# Patient Record
Sex: MALE | Race: WHITE | NOT HISPANIC OR LATINO
[De-identification: names, ages, dates, MRNs, and addresses within clinical notes are randomized per-mention and may not be internally consistent; named-entity substitution may affect disease eponyms.]

---

## 2017-02-01 ENCOUNTER — APPOINTMENT (OUTPATIENT)
Dept: UROLOGY | Facility: CLINIC | Age: 67
End: 2017-02-01

## 2017-02-02 LAB
PSA FREE FLD-MCNC: 17.2 %
PSA FREE SERPL-MCNC: 1.13 NG/ML
PSA SERPL-MCNC: 6.57 NG/ML

## 2017-08-09 ENCOUNTER — APPOINTMENT (OUTPATIENT)
Dept: UROLOGY | Facility: CLINIC | Age: 67
End: 2017-08-09
Payer: COMMERCIAL

## 2017-08-09 PROCEDURE — 99213 OFFICE O/P EST LOW 20 MIN: CPT

## 2017-08-10 LAB
APPEARANCE: CLEAR
BACTERIA: NEGATIVE
BILIRUBIN URINE: NEGATIVE
BLOOD URINE: NEGATIVE
COLOR: YELLOW
CORE LAB FLUID CYTOLOGY: NORMAL
GLUCOSE QUALITATIVE U: NORMAL MG/DL
HYALINE CASTS: 0 /LPF
KETONES URINE: NEGATIVE
LEUKOCYTE ESTERASE URINE: NEGATIVE
MICROSCOPIC-UA: NORMAL
NITRITE URINE: NEGATIVE
PH URINE: 6.5
PROTEIN URINE: NEGATIVE MG/DL
PSA FREE FLD-MCNC: 13.3
PSA FREE SERPL-MCNC: 0.26 NG/ML
PSA SERPL-MCNC: 1.95 NG/ML
RED BLOOD CELLS URINE: 5 /HPF
SPECIFIC GRAVITY URINE: 1.02
SQUAMOUS EPITHELIAL CELLS: 0 /HPF
UROBILINOGEN URINE: NORMAL MG/DL
WHITE BLOOD CELLS URINE: 1 /HPF

## 2017-08-25 ENCOUNTER — EMERGENCY (EMERGENCY)
Facility: HOSPITAL | Age: 67
LOS: 1 days | Discharge: HOME | End: 2017-08-25
Attending: EMERGENCY MEDICINE | Admitting: EMERGENCY MEDICINE
Payer: COMMERCIAL

## 2017-08-25 VITALS
HEART RATE: 98 BPM | TEMPERATURE: 99 F | RESPIRATION RATE: 18 BRPM | SYSTOLIC BLOOD PRESSURE: 135 MMHG | DIASTOLIC BLOOD PRESSURE: 68 MMHG | OXYGEN SATURATION: 98 %

## 2017-08-25 VITALS
HEART RATE: 103 BPM | OXYGEN SATURATION: 95 % | SYSTOLIC BLOOD PRESSURE: 144 MMHG | RESPIRATION RATE: 20 BRPM | TEMPERATURE: 98 F | DIASTOLIC BLOOD PRESSURE: 83 MMHG

## 2017-08-25 LAB
ALBUMIN SERPL ELPH-MCNC: 3.9 G/DL — SIGNIFICANT CHANGE UP (ref 3.3–5)
ALP SERPL-CCNC: 86 U/L — SIGNIFICANT CHANGE UP (ref 40–120)
ALT FLD-CCNC: 19 U/L RC — SIGNIFICANT CHANGE UP (ref 10–45)
ANION GAP SERPL CALC-SCNC: 15 MMOL/L — SIGNIFICANT CHANGE UP (ref 5–17)
AST SERPL-CCNC: 22 U/L — SIGNIFICANT CHANGE UP (ref 10–40)
BASOPHILS # BLD AUTO: 0 K/UL — SIGNIFICANT CHANGE UP (ref 0–0.2)
BASOPHILS NFR BLD AUTO: 0 % — SIGNIFICANT CHANGE UP (ref 0–2)
BILIRUB SERPL-MCNC: 0.7 MG/DL — SIGNIFICANT CHANGE UP (ref 0.2–1.2)
BUN SERPL-MCNC: 17 MG/DL — SIGNIFICANT CHANGE UP (ref 7–23)
CALCIUM SERPL-MCNC: 8.8 MG/DL — SIGNIFICANT CHANGE UP (ref 8.4–10.5)
CHLORIDE SERPL-SCNC: 104 MMOL/L — SIGNIFICANT CHANGE UP (ref 96–108)
CK SERPL-CCNC: 125 U/L — SIGNIFICANT CHANGE UP (ref 30–200)
CO2 SERPL-SCNC: 23 MMOL/L — SIGNIFICANT CHANGE UP (ref 22–31)
CREAT SERPL-MCNC: 1.18 MG/DL — SIGNIFICANT CHANGE UP (ref 0.5–1.3)
CRP SERPL-MCNC: 0.9 MG/DL — HIGH (ref 0–0.4)
EOSINOPHIL # BLD AUTO: 0 K/UL — SIGNIFICANT CHANGE UP (ref 0–0.5)
EOSINOPHIL NFR BLD AUTO: 0.2 % — SIGNIFICANT CHANGE UP (ref 0–6)
ERYTHROCYTE [SEDIMENTATION RATE] IN BLOOD: 13 MM/HR — SIGNIFICANT CHANGE UP (ref 0–20)
GAS PNL BLDV: SIGNIFICANT CHANGE UP
GLUCOSE SERPL-MCNC: 110 MG/DL — HIGH (ref 70–99)
HCT VFR BLD CALC: 43.5 % — SIGNIFICANT CHANGE UP (ref 39–50)
HGB BLD-MCNC: 15.1 G/DL — SIGNIFICANT CHANGE UP (ref 13–17)
LYMPHOCYTES # BLD AUTO: 0.6 K/UL — LOW (ref 1–3.3)
LYMPHOCYTES # BLD AUTO: 10.3 % — LOW (ref 13–44)
MCHC RBC-ENTMCNC: 33.6 PG — SIGNIFICANT CHANGE UP (ref 27–34)
MCHC RBC-ENTMCNC: 34.7 GM/DL — SIGNIFICANT CHANGE UP (ref 32–36)
MCV RBC AUTO: 96.9 FL — SIGNIFICANT CHANGE UP (ref 80–100)
MONOCYTES # BLD AUTO: 0.6 K/UL — SIGNIFICANT CHANGE UP (ref 0–0.9)
MONOCYTES NFR BLD AUTO: 9 % — SIGNIFICANT CHANGE UP (ref 2–14)
NEUTROPHILS # BLD AUTO: 5 K/UL — SIGNIFICANT CHANGE UP (ref 1.8–7.4)
NEUTROPHILS NFR BLD AUTO: 80.4 % — HIGH (ref 43–77)
PLATELET # BLD AUTO: 168 K/UL — SIGNIFICANT CHANGE UP (ref 150–400)
POTASSIUM SERPL-MCNC: 3.7 MMOL/L — SIGNIFICANT CHANGE UP (ref 3.5–5.3)
POTASSIUM SERPL-SCNC: 3.7 MMOL/L — SIGNIFICANT CHANGE UP (ref 3.5–5.3)
PROT SERPL-MCNC: 6.9 G/DL — SIGNIFICANT CHANGE UP (ref 6–8.3)
RBC # BLD: 4.49 M/UL — SIGNIFICANT CHANGE UP (ref 4.2–5.8)
RBC # FLD: 11.4 % — SIGNIFICANT CHANGE UP (ref 10.3–14.5)
SODIUM SERPL-SCNC: 142 MMOL/L — SIGNIFICANT CHANGE UP (ref 135–145)
WBC # BLD: 6.2 K/UL — SIGNIFICANT CHANGE UP (ref 3.8–10.5)
WBC # FLD AUTO: 6.2 K/UL — SIGNIFICANT CHANGE UP (ref 3.8–10.5)

## 2017-08-25 PROCEDURE — 84132 ASSAY OF SERUM POTASSIUM: CPT

## 2017-08-25 PROCEDURE — 99284 EMERGENCY DEPT VISIT MOD MDM: CPT | Mod: 25

## 2017-08-25 PROCEDURE — 82550 ASSAY OF CK (CPK): CPT

## 2017-08-25 PROCEDURE — 82947 ASSAY GLUCOSE BLOOD QUANT: CPT

## 2017-08-25 PROCEDURE — 85014 HEMATOCRIT: CPT

## 2017-08-25 PROCEDURE — 86140 C-REACTIVE PROTEIN: CPT

## 2017-08-25 PROCEDURE — 82435 ASSAY OF BLOOD CHLORIDE: CPT

## 2017-08-25 PROCEDURE — 96374 THER/PROPH/DIAG INJ IV PUSH: CPT

## 2017-08-25 PROCEDURE — 80053 COMPREHEN METABOLIC PANEL: CPT

## 2017-08-25 PROCEDURE — 83605 ASSAY OF LACTIC ACID: CPT

## 2017-08-25 PROCEDURE — 82803 BLOOD GASES ANY COMBINATION: CPT

## 2017-08-25 PROCEDURE — 85027 COMPLETE CBC AUTOMATED: CPT

## 2017-08-25 PROCEDURE — 85652 RBC SED RATE AUTOMATED: CPT

## 2017-08-25 PROCEDURE — 84295 ASSAY OF SERUM SODIUM: CPT

## 2017-08-25 PROCEDURE — 82330 ASSAY OF CALCIUM: CPT

## 2017-08-25 RX ORDER — KETOROLAC TROMETHAMINE 30 MG/ML
15 SYRINGE (ML) INJECTION ONCE
Qty: 0 | Refills: 0 | Status: DISCONTINUED | OUTPATIENT
Start: 2017-08-25 | End: 2017-08-25

## 2017-08-25 RX ADMIN — Medication 15 MILLIGRAM(S): at 04:00

## 2017-08-25 NOTE — ED PROVIDER NOTE - ATTENDING CONTRIBUTION TO CARE
Dr. Levine (Attending Physician)  I performed a history and physical exam of the patient and discussed their management with the resident. I reviewed the resident's note and agree with the documented findings and plan of care. My medical decision making and observations are found above.

## 2017-08-25 NOTE — ED PROVIDER NOTE - SKIN RASH DESCRIPTION
b/l medial calf erythema [id number], warm with mild tenderness,  medial thigh erythema 3x3cm, no inguinal Lymphadenopathy

## 2017-08-25 NOTE — ED PROVIDER NOTE - CARE PLAN
Principal Discharge DX:	Erythema nodosum  Instructions for follow-up, activity and diet:	You were seen in the Emergency Department for rash. Your examination and lab tests were reassuring. Please follow up with dermatology and rheumatology for further evaluation. Please return to the Emergency Department if you have any new concerning symptoms such as severe pain, weakness, shortness of breath or any other concerning symptoms.

## 2017-08-25 NOTE — ED PROVIDER NOTE - MEDICAL DECISION MAKING DETAILS
Dr. Levine (Attending Physician)  Pt. with painful patches to bilateral lower extremities since this morning on his legs.  No recent URI symptoms.  No ho IBD.  No rash on palms and soles, no oral lesion.  Likely erythema nodosum.  Will give follow-up with derm and rheumatology.  Advised ibuprofen.

## 2017-08-25 NOTE — ED PROVIDER NOTE - PLAN OF CARE
You were seen in the Emergency Department for rash. Your examination and lab tests were reassuring. Please follow up with dermatology and rheumatology for further evaluation. Please return to the Emergency Department if you have any new concerning symptoms such as severe pain, weakness, shortness of breath or any other concerning symptoms.

## 2017-08-25 NOTE — ED ADULT NURSE NOTE - OBJECTIVE STATEMENT
0147 pt 66y m aox4 c/o of lwr ext red spotting noticed in am, seen by pmd and dx poss bug bites, states the redness is getting wider and warm to touch, pt pending eval/vss no distress family at bedside/gcruz

## 2017-08-25 NOTE — ED PROVIDER NOTE - OBJECTIVE STATEMENT
65yo p/w rash. Pt. woke up this morning with erythma to b/l lower extremities, pain to the sites of the rash. Pt. reports pain to right inguinal region radiating downward. Pt. reports chills and nausea. Denies fevers, cough, chest pain, shortness ofbreath, recent travel, outdoor activities, insect bites, new exposures, foods, detergents, lotions, sick contacts, falls. Pt. taking benadryl and benadryl cream today w/o relief. Pt. has prostate cancer s/p radiation(march), HTN.

## 2017-08-26 ENCOUNTER — INPATIENT (INPATIENT)
Facility: HOSPITAL | Age: 67
LOS: 2 days | Discharge: HOME | DRG: 303 | End: 2017-08-29
Attending: INTERNAL MEDICINE | Admitting: INTERNAL MEDICINE
Payer: COMMERCIAL

## 2017-08-26 VITALS
SYSTOLIC BLOOD PRESSURE: 143 MMHG | OXYGEN SATURATION: 94 % | RESPIRATION RATE: 18 BRPM | TEMPERATURE: 99 F | HEART RATE: 80 BPM | DIASTOLIC BLOOD PRESSURE: 71 MMHG

## 2017-08-26 DIAGNOSIS — Z90.49 ACQUIRED ABSENCE OF OTHER SPECIFIED PARTS OF DIGESTIVE TRACT: Chronic | ICD-10-CM

## 2017-08-26 DIAGNOSIS — Z96.649 PRESENCE OF UNSPECIFIED ARTIFICIAL HIP JOINT: Chronic | ICD-10-CM

## 2017-08-26 LAB
ALBUMIN SERPL ELPH-MCNC: 4 G/DL — SIGNIFICANT CHANGE UP (ref 3.3–5)
ALP SERPL-CCNC: 92 U/L — SIGNIFICANT CHANGE UP (ref 40–120)
ALT FLD-CCNC: 21 U/L RC — SIGNIFICANT CHANGE UP (ref 10–45)
ANION GAP SERPL CALC-SCNC: 12 MMOL/L — SIGNIFICANT CHANGE UP (ref 5–17)
AST SERPL-CCNC: 34 U/L — SIGNIFICANT CHANGE UP (ref 10–40)
BASE EXCESS BLDV CALC-SCNC: 2.8 MMOL/L — HIGH (ref -2–2)
BASOPHILS # BLD AUTO: 0 K/UL — SIGNIFICANT CHANGE UP (ref 0–0.2)
BASOPHILS NFR BLD AUTO: 0 % — SIGNIFICANT CHANGE UP (ref 0–2)
BILIRUB SERPL-MCNC: 0.3 MG/DL — SIGNIFICANT CHANGE UP (ref 0.2–1.2)
BUN SERPL-MCNC: 13 MG/DL — SIGNIFICANT CHANGE UP (ref 7–23)
CA-I SERPL-SCNC: 1.2 MMOL/L — SIGNIFICANT CHANGE UP (ref 1.12–1.3)
CALCIUM SERPL-MCNC: 9.2 MG/DL — SIGNIFICANT CHANGE UP (ref 8.4–10.5)
CHLORIDE BLDV-SCNC: 105 MMOL/L — SIGNIFICANT CHANGE UP (ref 96–108)
CHLORIDE SERPL-SCNC: 104 MMOL/L — SIGNIFICANT CHANGE UP (ref 96–108)
CO2 BLDV-SCNC: 31 MMOL/L — HIGH (ref 22–30)
CO2 SERPL-SCNC: 25 MMOL/L — SIGNIFICANT CHANGE UP (ref 22–31)
CREAT SERPL-MCNC: 1.07 MG/DL — SIGNIFICANT CHANGE UP (ref 0.5–1.3)
EOSINOPHIL # BLD AUTO: 0.1 K/UL — SIGNIFICANT CHANGE UP (ref 0–0.5)
EOSINOPHIL NFR BLD AUTO: 2.1 % — SIGNIFICANT CHANGE UP (ref 0–6)
GAS PNL BLDV: 140 MMOL/L — SIGNIFICANT CHANGE UP (ref 136–145)
GAS PNL BLDV: SIGNIFICANT CHANGE UP
GLUCOSE BLDV-MCNC: 104 MG/DL — HIGH (ref 70–99)
GLUCOSE SERPL-MCNC: 109 MG/DL — HIGH (ref 70–99)
HCO3 BLDV-SCNC: 29 MMOL/L — SIGNIFICANT CHANGE UP (ref 21–29)
HCT VFR BLD CALC: 46.3 % — SIGNIFICANT CHANGE UP (ref 39–50)
HCT VFR BLDA CALC: 48 % — SIGNIFICANT CHANGE UP (ref 39–50)
HGB BLD CALC-MCNC: 15.7 G/DL — SIGNIFICANT CHANGE UP (ref 13–17)
HGB BLD-MCNC: 15.7 G/DL — SIGNIFICANT CHANGE UP (ref 13–17)
LACTATE BLDV-MCNC: 1.1 MMOL/L — SIGNIFICANT CHANGE UP (ref 0.7–2)
LYMPHOCYTES # BLD AUTO: 0.6 K/UL — LOW (ref 1–3.3)
LYMPHOCYTES # BLD AUTO: 15 % — SIGNIFICANT CHANGE UP (ref 13–44)
MCHC RBC-ENTMCNC: 33.4 PG — SIGNIFICANT CHANGE UP (ref 27–34)
MCHC RBC-ENTMCNC: 33.9 GM/DL — SIGNIFICANT CHANGE UP (ref 32–36)
MCV RBC AUTO: 98.6 FL — SIGNIFICANT CHANGE UP (ref 80–100)
MONOCYTES # BLD AUTO: 0.5 K/UL — SIGNIFICANT CHANGE UP (ref 0–0.9)
MONOCYTES NFR BLD AUTO: 11.1 % — SIGNIFICANT CHANGE UP (ref 2–14)
NEUTROPHILS # BLD AUTO: 3.1 K/UL — SIGNIFICANT CHANGE UP (ref 1.8–7.4)
NEUTROPHILS NFR BLD AUTO: 71.8 % — SIGNIFICANT CHANGE UP (ref 43–77)
OTHER CELLS CSF MANUAL: 7 ML/DL — LOW (ref 18–22)
PCO2 BLDV: 53 MMHG — HIGH (ref 35–50)
PH BLDV: 7.36 — SIGNIFICANT CHANGE UP (ref 7.35–7.45)
PLATELET # BLD AUTO: 174 K/UL — SIGNIFICANT CHANGE UP (ref 150–400)
PO2 BLDV: 23 MMHG — LOW (ref 25–45)
POTASSIUM BLDV-SCNC: 4.5 MMOL/L — SIGNIFICANT CHANGE UP (ref 3.5–5)
POTASSIUM SERPL-MCNC: 5 MMOL/L — SIGNIFICANT CHANGE UP (ref 3.5–5.3)
POTASSIUM SERPL-SCNC: 5 MMOL/L — SIGNIFICANT CHANGE UP (ref 3.5–5.3)
PROT SERPL-MCNC: 7.7 G/DL — SIGNIFICANT CHANGE UP (ref 6–8.3)
RBC # BLD: 4.7 M/UL — SIGNIFICANT CHANGE UP (ref 4.2–5.8)
RBC # FLD: 11.4 % — SIGNIFICANT CHANGE UP (ref 10.3–14.5)
SAO2 % BLDV: 32 % — LOW (ref 67–88)
SODIUM SERPL-SCNC: 141 MMOL/L — SIGNIFICANT CHANGE UP (ref 135–145)
WBC # BLD: 4.2 K/UL — SIGNIFICANT CHANGE UP (ref 3.8–10.5)
WBC # FLD AUTO: 4.2 K/UL — SIGNIFICANT CHANGE UP (ref 3.8–10.5)

## 2017-08-26 PROCEDURE — 93970 EXTREMITY STUDY: CPT | Mod: 26

## 2017-08-26 PROCEDURE — 99220: CPT

## 2017-08-26 RX ORDER — CEFAZOLIN SODIUM 1 G
1000 VIAL (EA) INJECTION EVERY 8 HOURS
Qty: 0 | Refills: 0 | Status: DISCONTINUED | OUTPATIENT
Start: 2017-08-26 | End: 2017-08-29

## 2017-08-26 RX ORDER — CEPHALEXIN 500 MG
500 CAPSULE ORAL
Qty: 0 | Refills: 0 | Status: DISCONTINUED | OUTPATIENT
Start: 2017-08-26 | End: 2017-08-26

## 2017-08-26 RX ORDER — AMLODIPINE BESYLATE 2.5 MG/1
5 TABLET ORAL DAILY
Qty: 0 | Refills: 0 | Status: DISCONTINUED | OUTPATIENT
Start: 2017-08-26 | End: 2017-08-28

## 2017-08-26 RX ORDER — OXYCODONE HYDROCHLORIDE 5 MG/1
5 TABLET ORAL ONCE
Qty: 0 | Refills: 0 | Status: DISCONTINUED | OUTPATIENT
Start: 2017-08-26 | End: 2017-08-26

## 2017-08-26 RX ORDER — SODIUM CHLORIDE 9 MG/ML
1000 INJECTION INTRAMUSCULAR; INTRAVENOUS; SUBCUTANEOUS
Qty: 0 | Refills: 0 | Status: DISCONTINUED | OUTPATIENT
Start: 2017-08-26 | End: 2017-08-27

## 2017-08-26 RX ORDER — KETOROLAC TROMETHAMINE 30 MG/ML
15 SYRINGE (ML) INJECTION ONCE
Qty: 0 | Refills: 0 | Status: DISCONTINUED | OUTPATIENT
Start: 2017-08-26 | End: 2017-08-26

## 2017-08-26 RX ORDER — ACETAMINOPHEN 500 MG
1000 TABLET ORAL ONCE
Qty: 0 | Refills: 0 | Status: COMPLETED | OUTPATIENT
Start: 2017-08-26 | End: 2017-08-26

## 2017-08-26 RX ADMIN — Medication 400 MILLIGRAM(S): at 19:25

## 2017-08-26 RX ADMIN — SODIUM CHLORIDE 125 MILLILITER(S): 9 INJECTION INTRAMUSCULAR; INTRAVENOUS; SUBCUTANEOUS at 22:54

## 2017-08-26 RX ADMIN — Medication 15 MILLIGRAM(S): at 19:55

## 2017-08-26 RX ADMIN — OXYCODONE HYDROCHLORIDE 5 MILLIGRAM(S): 5 TABLET ORAL at 20:30

## 2017-08-26 RX ADMIN — OXYCODONE HYDROCHLORIDE 5 MILLIGRAM(S): 5 TABLET ORAL at 19:56

## 2017-08-26 RX ADMIN — Medication 15 MILLIGRAM(S): at 20:30

## 2017-08-26 RX ADMIN — Medication 1000 MILLIGRAM(S): at 19:40

## 2017-08-26 RX ADMIN — Medication 500 MILLIGRAM(S): at 19:19

## 2017-08-26 RX ADMIN — AMLODIPINE BESYLATE 5 MILLIGRAM(S): 2.5 TABLET ORAL at 23:11

## 2017-08-26 RX ADMIN — Medication 100 MILLIGRAM(S): at 19:20

## 2017-08-26 NOTE — ED CDU PROVIDER NOTE - DETAILS
CELLULITIS Vs. LYME  -IVF  -IV Abx  -ID Following  -Pain/Fever Control  -Freq Re-eval  Case d/w Attending Lexie CELLULITIS Vs. Early Disseminated Lyme  -IVF  -IV Abx  -ID Following  -Pain/Fever Control  -David Re-eval  Case d/w Attending Lexie

## 2017-08-26 NOTE — ED PROVIDER NOTE - NS ED ROS FT
CONST: no fevers, positive chills  EYES: no pain  ENT: no sore throat   CV: no chest pain  RESP: no shortness of breath  ABD: no abdominal pain   : no dysuria  MSK: right hip pain  NEURO: no headache or additional neurologic complaints  HEME: no easy bleeding  SKIN:  erythema on right calf

## 2017-08-26 NOTE — ED CDU PROVIDER NOTE - SKIN RASH DESCRIPTION
flat, erythematous patchy areas on b/l LE from ankle to thighs R > L, + warmth and ttp, sensation intact, DP pulses 2+ b/l

## 2017-08-26 NOTE — CONSULT NOTE ADULT - SUBJECTIVE AND OBJECTIVE BOX
HPI:      PAST MEDICAL & SURGICAL HISTORY:  Prostate CA: finished treatment in 2017 (march)  HTN (hypertension)  S/P cholecystectomy  S/P colon resection: diverticulitis  History of hip replacement      Allergies  No Known Allergies        ANTIMICROBIALS:      OTHER MEDS:      SOCIAL HISTORY: [ ] etoh [ ] tobacco [ ] former smoker [ ] IVDU    FAMILY HISTORY:      REVIEW OF SYSTEMS  [  ] ROS unobtainable because:    [  ] All other systems negative except as noted below:	    Constitutional:  [ ] fever [ ] weight loss  Skin:  [ ] rash [ ] phlebitis	  Eyes: [ ] icterus [ ] inflammation	  ENMT: [ ] discharge [ ] thrush [ ] ulcers [ ] exudates  Respiratory: [ ] dyspnea [ ] hemoptysis [ ] cough [ ] sputum	  Cardiovascular:  [ ] chest pain [ ] palpitations [ ] edema	  Gastrointestinal:  [ ] nausea [ ] vomiting [ ] diarrhea [ ] constipation [ ] pain	  Genitourinary:  [ ] dysuria [ ] frequency [ ] hematuria [ ] discharge [ ] flank pain  Musculoskeletal:  [ ] myalgias [ ] arthralgias [ ] arthritis	  Neurological:  [ ] headache [ ] seizures	  Psychiatric:  [ ] anxiety [ ] depression	  Hematology/Lymphatics:  [ ] lymphadenopathy  Endocrine:  [ ] adrenal [ ] thyroid  Allergic/Immunologic:	 [ ] transplant [ ] seasonal    Vital Signs Last 24 Hrs  T(F): 99.1 (08-26-17 @ 15:31), Max: 99.1 (08-26-17 @ 15:31)    Vital Signs Last 24 Hrs  HR: 80 (08-26-17 @ 15:31) (80 - 80)  BP: 143/71 (08-26-17 @ 15:31) (143/71 - 143/71)  RR: 18 (08-26-17 @ 15:31)  SpO2: 94% (08-26-17 @ 15:31) (94% - 94%)  Wt(kg): --    PHYSICAL EXAM:  General: non-toxic  HEAD/EYES: anicteric, PERRL  ENT:  supple  Cardiovascular:   S1, S2  Respiratory:  clear bilaterally  GI:  soft, non-tender, normal bowel sounds  :  no CVA tenderness   Musculoskeletal:  no synovitis  Neurologic:  grossly non-focal  Skin:  no rash  Lymph: no lymphadenopathy  Psychiatric:  appropriate affect  Vascular:  no phlebitis                                15.7   4.2   )-----------( 174      ( 26 Aug 2017 17:11 )             46.3       08-26    141  |  104  |  13  ----------------------------<  109<H>  5.0   |  25  |  1.07    Ca    9.2      26 Aug 2017 17:11    TPro  7.7  /  Alb  4.0  /  TBili  0.3  /  DBili  x   /  AST  34  /  ALT  21  /  AlkPhos  92  08-26          MICROBIOLOGY:          v            RADIOLOGY: HPI:   66 year old, presenting with left calf erythema since Thursday morning. On Thursday patient had similar lesions on left calF and medial thighs. patient was refered to dermatology with biopsy obtained patient now with extreme difficulty walking, growing erythema on right calf and warmth. no fevers. pain is well treated with motrin.       PAST MEDICAL & SURGICAL HISTORY:  Prostate CA: finished treatment in 2017 (march)  HTN (hypertension)  S/P cholecystectomy  S/P colon resection: diverticulitis  History of hip replacement      Allergies  No Known Allergies        ANTIMICROBIALS:      OTHER MEDS:      SOCIAL HISTORY: [ ] etoh [ ] tobacco [ ] former smoker [ ] IVDU    FAMILY HISTORY:      REVIEW OF SYSTEMS  [  ] ROS unobtainable because:    [  ] All other systems negative except as noted below:	    Constitutional:  [ ] fever [ ] weight loss  Skin:  [ ] rash [ ] phlebitis	  Eyes: [ ] icterus [ ] inflammation	  ENMT: [ ] discharge [ ] thrush [ ] ulcers [ ] exudates  Respiratory: [ ] dyspnea [ ] hemoptysis [ ] cough [ ] sputum	  Cardiovascular:  [ ] chest pain [ ] palpitations [ ] edema	  Gastrointestinal:  [ ] nausea [ ] vomiting [ ] diarrhea [ ] constipation [ ] pain	  Genitourinary:  [ ] dysuria [ ] frequency [ ] hematuria [ ] discharge [ ] flank pain  Musculoskeletal:  [ ] myalgias [ ] arthralgias [ ] arthritis	  Neurological:  [ ] headache [ ] seizures	  Psychiatric:  [ ] anxiety [ ] depression	  Hematology/Lymphatics:  [ ] lymphadenopathy  Endocrine:  [ ] adrenal [ ] thyroid  Allergic/Immunologic:	 [ ] transplant [ ] seasonal    Vital Signs Last 24 Hrs  T(F): 99.1 (08-26-17 @ 15:31), Max: 99.1 (08-26-17 @ 15:31)    Vital Signs Last 24 Hrs  HR: 80 (08-26-17 @ 15:31) (80 - 80)  BP: 143/71 (08-26-17 @ 15:31) (143/71 - 143/71)  RR: 18 (08-26-17 @ 15:31)  SpO2: 94% (08-26-17 @ 15:31) (94% - 94%)  Wt(kg): --    PHYSICAL EXAM:  General: non-toxic  HEAD/EYES: anicteric, PERRL  ENT:  supple  Cardiovascular:   S1, S2  Respiratory:  clear bilaterally  GI:  soft, non-tender, normal bowel sounds  :  no CVA tenderness   Musculoskeletal:  no synovitis  Neurologic:  grossly non-focal  Skin:  no rash  Lymph: no lymphadenopathy  Psychiatric:  appropriate affect  Vascular:  no phlebitis                                15.7   4.2   )-----------( 174      ( 26 Aug 2017 17:11 )             46.3       08-26    141  |  104  |  13  ----------------------------<  109<H>  5.0   |  25  |  1.07    Ca    9.2      26 Aug 2017 17:11    TPro  7.7  /  Alb  4.0  /  TBili  0.3  /  DBili  x   /  AST  34  /  ALT  21  /  AlkPhos  92  08-26          MICROBIOLOGY:          v            RADIOLOGY: HPI:   66 year old, presenting with left calf erythema since Thursday morning. On Thursday patient had similar lesions on left calF and medial thighs. patient was refered to dermatology with biopsy obtained patient now with extreme difficulty walking, growing erythema on right calf and warmth. no fevers. pain is well treated with motrin.   pt recently came from [place], though he does not complain of tick bits  no recent trauma and no recent bites,   no pets, no h.o recent cellulitis  chills at home but no fever.  vitals stable on presentation   no major lab abnormalities detected        PAST MEDICAL & SURGICAL HISTORY:  Prostate CA: finished treatment in 2017 (march)  HTN (hypertension)  S/P cholecystectomy  S/P colon resection: diverticulitis  History of hip replacement      Allergies  No Known Allergies        ANTIMICROBIALS:      OTHER MEDS:      SOCIAL HISTORY:non smoker and non alcoholic    FAMILY HISTORY:non significant       REVIEW OF SYSTEMS  [  ] ROS unobtainable because:    [ x ] All other systems negative except as noted below:	    Constitutional:  [ ] fever [ ] weight loss  Skin:  [x ] rash [ ] phlebitis	  Eyes: [ ] icterus [ ] inflammation	  ENMT: [ ] discharge [ ] thrush [ ] ulcers [ ] exudates  Respiratory: [ ] dyspnea [ ] hemoptysis [ ] cough [ ] sputum	  Cardiovascular:  [ ] chest pain [ ] palpitations [ ] edema	  Gastrointestinal:  [ ] nausea [ ] vomiting [ ] diarrhea [ ] constipation [ ] pain	  Genitourinary:  [ ] dysuria [ ] frequency [ ] hematuria [ ] discharge [ ] flank pain  Musculoskeletal:  [ ] myalgias [ ] arthralgias [ ] arthritis	  Neurological:  [ ] headache [ ] seizures	  Psychiatric:  [ ] anxiety [ ] depression	  Hematology/Lymphatics:  [ ] lymphadenopathy  Endocrine:  [ ] adrenal [ ] thyroid  Allergic/Immunologic:	 [ ] transplant [ ] seasonal    Vital Signs Last 24 Hrs  T(F): 99.1 (08-26-17 @ 15:31), Max: 99.1 (08-26-17 @ 15:31)    Vital Signs Last 24 Hrs  HR: 80 (08-26-17 @ 15:31) (80 - 80)  BP: 143/71 (08-26-17 @ 15:31) (143/71 - 143/71)  RR: 18 (08-26-17 @ 15:31)  SpO2: 94% (08-26-17 @ 15:31) (94% - 94%)  Wt(kg): --    PHYSICAL EXAM:  General: non-toxic  HEAD/EYES: anicteric, PERRL  ENT:  supple  Cardiovascular:   S1, S2  Respiratory:  clear bilaterally  GI:  soft, non-tender, normal bowel sounds  :  no CVA tenderness   Musculoskeletal:  no synovitis  Neurologic:  grossly non-focal  Skin: b/l lower extermity rash, ascending, and tender to touch   Lymph: no lymphadenopathy  Psychiatric:  appropriate affect  Vascular:  no phlebitis                                15.7   4.2   )-----------( 174      ( 26 Aug 2017 17:11 )             46.3       08-26    141  |  104  |  13  ----------------------------<  109<H>  5.0   |  25  |  1.07    Ca    9.2      26 Aug 2017 17:11    TPro  7.7  /  Alb  4.0  /  TBili  0.3  /  DBili  x   /  AST  34  /  ALT  21  /  AlkPhos  92  08-26          MICROBIOLOGY:- none           v            RADIOLOGY- none HPI:   66 year old, presenting with left calf erythema since Thursday morning. On Thursday patient had similar lesions on left calF and medial thighs. patient was refered to dermatology with biopsy obtained patient now with extreme difficulty walking, growing erythema on right calf and warmth. no fevers. pain is well treated with motrin.   pt recently came from [place], though he does not complain of tick bits  no recent trauma and no recent bites,   no pets, no h.o recent cellulitis  chills at home but no fever.  vitals stable on presentation   no major lab abnormalities detected        PAST MEDICAL & SURGICAL HISTORY:  Prostate CA: finished treatment in 2017 (march)  HTN (hypertension)  S/P cholecystectomy  S/P colon resection: diverticulitis  History of hip replacement      Allergies  No Known Allergies        ANTIMICROBIALS:      OTHER MEDS:      SOCIAL HISTORY:non smoker and non alcoholic    FAMILY HISTORY:non significant       REVIEW OF SYSTEMS  [  ] ROS unobtainable because:    [ x ] All other systems negative except as noted below:	    Constitutional:  [ ] fever [ ] weight loss  Skin:  [x ] rash [ ] phlebitis	  Eyes: [ ] icterus [ ] inflammation	  ENMT: [ ] discharge [ ] thrush [ ] ulcers [ ] exudates  Respiratory: [ ] dyspnea [ ] hemoptysis [ ] cough [ ] sputum	  Cardiovascular:  [ ] chest pain [ ] palpitations [ ] edema	  Gastrointestinal:  [ ] nausea [ ] vomiting [ ] diarrhea [ ] constipation [ ] pain	  Genitourinary:  [ ] dysuria [ ] frequency [ ] hematuria [ ] discharge [ ] flank pain  Musculoskeletal:  [ ] myalgias [ ] arthralgias [ ] arthritis	  Neurological:  [ ] headache [ ] seizures	  Psychiatric:  [ ] anxiety [ ] depression	  Hematology/Lymphatics:  [ ] lymphadenopathy  Endocrine:  [ ] adrenal [ ] thyroid  Allergic/Immunologic:	 [ ] transplant [ ] seasonal    Vital Signs Last 24 Hrs  T(F): 99.1 (08-26-17 @ 15:31), Max: 99.1 (08-26-17 @ 15:31)    Vital Signs Last 24 Hrs  HR: 80 (08-26-17 @ 15:31) (80 - 80)  BP: 143/71 (08-26-17 @ 15:31) (143/71 - 143/71)  RR: 18 (08-26-17 @ 15:31)  SpO2: 94% (08-26-17 @ 15:31) (94% - 94%)  Wt(kg): --    PHYSICAL EXAM:  General: non-toxic  HEAD/EYES: anicteric, PERRL  ENT:  supple  Cardiovascular:   S1, S2  Respiratory:  clear bilaterally  GI:  soft, non-tender, normal bowel sounds  :  no CVA tenderness   Musculoskeletal:  no synovitis  Neurologic:  grossly non-focal  Skin: b/l lower extermity rash, ascending, and tender to touch   Lymph: no lymphadenopathy  Psychiatric:  appropriate affect  Vascular:  no phlebitis                                15.7   4.2   )-----------( 174      ( 26 Aug 2017 17:11 )             46.3       08-26    141  |  104  |  13  ----------------------------<  109<H>  5.0   |  25  |  1.07    Ca    9.2      26 Aug 2017 17:11    TPro  7.7  /  Alb  4.0  /  TBili  0.3  /  DBili  x   /  AST  34  /  ALT  21  /  AlkPhos  92  08-26          MICROBIOLOGY:- none       RADIOLOGY- none

## 2017-08-26 NOTE — ED CDU PROVIDER NOTE - PROGRESS NOTE DETAILS
Spoke with ID Fellow regarding patient's stay in CDU instead of d/c home. Agrees with plan for IV Abx. Will start doxy 100mg q12 and cefazolin 1g Q8. ID will see patient in AM. - Yeimi Thomas PA-C Patient asleep resting comfortably. NAD. VSS. - Yeimi Thomas PA-C Patient resting. NAD. VSS. - Yeimi Thomas PA-C Patient resting in bed. Reports burning pain returns every 3.5-4 hours after oxycodone. Also reports erythema is spreading on lower extremities. VSS. On exam, darkening erythema on b/l calves with spreading erythema on medial thighs and to right hip, all areas warm and ttp. Will give pain medication and continue to monitor. - Yeimi Thomas PA-C Lying in bed, reports that the pain and redness in both legs is still increasing in severity and size respectively. Vital Signs within normal limits Patient resting in bed comfortably. No distress, no complaints. Vital Signs Stable. The pain is still severe, and debilitating. He has difficulty ambulating secondary to the pain in both legs . -Lambert Lucero PA-C Lying in bed, reports that the pain and redness in both legs is still increasing in severity and size respectively. Vital Signs within normal limits. - Lambert Lucero PA-C Dr. Sue: Pt seen by ID who are recommending admission at this time for worsening BL LE cellulitis despite abx. Pt still c/o pain which is improved w medications. Duplex neg. Stable for admission.

## 2017-08-26 NOTE — ED ADULT NURSE REASSESSMENT NOTE - NS ED NURSE REASSESS COMMENT FT1
Redness noted on b/l legs. Redness noted on b/l legs below knees. Pt c/o pain on R leg. Dr. Pfeiffer aware.

## 2017-08-26 NOTE — ED ADULT NURSE REASSESSMENT NOTE - NS ED NURSE REASSESS COMMENT FT1
Received pt from ANNMARIE Petit, received pt alert and responsive, oriented x4, denies any respiratory distress, SOB, or difficulty breathing. Pt transferred to CDU for observation for BLE cellulitis, pt states 4/10 "burning sensation" to BLE, will treat pain as needed, per PA pain med not due at this time, will continue to monitor and manage, pt placed in IV fluids, tolerating well. IV in place, patent and free of signs of infiltration, pending IV antibiotics at 0100 and 0700, pt aware. V/S stable, pt afebrile, Pt educated on unit and unit rules, instructed patient to notify RN of any needed assistance, Pt verbalizes understanding, Call bell placed within reach. Safety maintained. Will continue to monitor. Spouse at bedside.

## 2017-08-26 NOTE — ED PROVIDER NOTE - OBJECTIVE STATEMENT
66 year old, presenting with left calf erythema since thursday morning. on Thursday patient had simila lesions on left ana and medial thighs. patient was refered to dermatology with biopsy obtained. patient now with extreme difficulty walking, growing erythema on right calf and warmth. no fevers. pain is well treated with motrin. ER and Dermatologist thought it could be fungal, erythema nodosum, bug bites, possible cellulitis.     PMD: shantel pérez 66 year old, presenting with left calf erythema since thursday morning. on Thursday patient had simila lesions on left ana and medial thighs. patient was refered to dermatology with biopsy obtained. patient now with extreme difficulty walking, growing erythema on right calf and warmth. no fevers. pain is well treated with motrin. ER and Dermatologist thought it could be fungal, erythema nodosum, bug bites, possible cellulitis.     PMD: shantel pérez    Attendinyo male presents with achy, erythematous rash on bilateral lower extremities for about 1 week.  Had biopsy by dermatology.  no fever/chills.  Sent by Dr. Hurley, a good friend of the patient, who wanted ID to see the patient.  NO fever, chills, nausea or vomiting.

## 2017-08-26 NOTE — ED PROVIDER NOTE - PROGRESS NOTE DETAILS
K[facility]rell PGY3: PMD [place] [id number]  [employer]:  pt seen by ID who believes rash is likely to be cellulitis, but could possibly be Lyme.  Too early in disease course to check lyme titer.  ID recommends cefedroxil and doxycycline.  Pt does not want to stay in hospital or in observation unit so will discharge home with close outpatient followup.  Spoke with Dr. Hurley regarding case as he called to check on patient.. [facility] PGY3: patient discharged and on receiving paperwork, patient was in excruciating leg pain in medial right thigh, remains tender in calfs bilaterally. will obtain dvt studies, provide analgesia. discussed case with CDU for possible obs

## 2017-08-26 NOTE — CONSULT NOTE ADULT - ASSESSMENT
67 yo male wcomes in with erythema of the legs and painful and b/l  he has mild elevation in temperature and no white count   he recently travelled to [place] and lives in [place], does not complain of ticks though   Would cover for lymes, and strp cellulitis  Would give oral doxycycline and Cefadroxil oral regimen for strp and lyme , can do lyme serology after two weeks outpt   give antifungal topical cream for the groin, obese male   Will d/w atttending 67 yo male comes in with erythema of the legs and painful and b/l  he has mild elevation in temperature and no white count   he recently travelled to [place] and lives in [place], does not complain of ticks though   Would cover for lymes, and strp cellulitis  Would give oral doxycycline and Cefadroxil oral regimen for strep and lyme , can do lyme serology after two weeks outpt   give antifungal topical cream for the groin, obese male   Will d/w atttending

## 2017-08-26 NOTE — ED CDU PROVIDER NOTE - OBJECTIVE STATEMENT
66 year old with PMH HTN, Prostate CA (Jan-march 2017 s/p radiation therapy), presenting with b/l calf erythema since Thursday morning that spread to medial thighs. patient was referred to dermatology with biopsy obtained. patient now with extreme difficulty walking due to pain, growing erythema on right calf and warmth. no fevers. pain was well treated with motrin. ER and Dermatologist thought it could be fungal, erythema nodosum, bug bites, possible cellulitis. Pt now reports pain at R hip with no skin changes. Also c/o chills. Denies fever, nausea/vomiting, mucous membrane involvement.    PCP Brock Danielle & (family friend) Terry Lombardo    In ED, pt seen by ED to be d/c home on doxy and cefpodoxime, however pt c/o unbearable pain. B/l LE doppler negative. Will admit to CDU for pain control and IV Abx; ID to see in AM. 66 year old with PMH HTN, Prostate CA (Jan-march 2017 s/p radiation therapy), presenting with b/l calf erythema, initially only LLE since Thursday morning, that spread to medial thighs. patient was referred to dermatology with biopsy obtained. patient now with extreme difficulty walking due to pain, growing erythema/warmth on right calf. no fevers. pain was well treated with motrin. ER and Dermatologist thought it could be fungal, erythema nodosum, bug bites, possible cellulitis. Pt now reports pain at R hip with no skin changes. Also c/o chills. Denies fever, nausea/vomiting, mucous membrane involvement.    PCP Brock Danielle & (family friend) Terry Lombardo    In ED, pt seen by ED to be d/c home on doxy and cefpodoxime, however pt c/o unbearable pain. B/l LE doppler negative. Will admit to CDU for pain control and IV Abx; ID to see in AM. 66 year old with PMH HTN, Prostate CA (Jan-march 2017 s/p radiation therapy), presenting with b/l calf erythema, initially only LLE since Thursday morning, that spread b/l and to medial thighs. Patient in ED yesterday, referred to dermatology with biopsy obtained. patient now with extreme difficulty walking due to pain, growing erythema/warmth on right calf. no fevers. pain was well treated with motrin. ER and Dermatologist thought it could be fungal, erythema nodosum, bug bites, possible cellulitis. Pt now reports pain at R hip with no skin changes. Also c/o chills. Denies fever, nausea/vomiting, mucous membrane involvement.    PCP Brock Danielle & (family friend) Terry Lombardo    In ED, pt seen by ED to be d/c home on doxy and cefpodoxime, however pt c/o unbearable pain. B/l LE doppler negative. Will admit to CDU for pain control and IV Abx; ID to see in AM. 66 year old with PMH HTN, Prostate CA (Jan-march 2017 s/p radiation therapy), presenting with b/l calf erythema since Thursday morning that spread to medial thighs. Patient in ED yesterday, referred to dermatology with biopsy obtained. patient now with extreme difficulty walking due to pain, growing erythema/warmth on right calf. no fevers. pain was well treated with motrin. ER and Dermatologist thought it could be fungal, erythema nodosum, bug bites, possible cellulitis. Pt now reports pain at R hip with no skin changes. Also c/o chills. Denies fever, nausea/vomiting, mucous membrane involvement.    PCP Brock Danielle & (family friend) Terry Lombardo    In ED, pt seen by ED to be d/c home on doxy and cefpodoxime, however pt c/o unbearable pain. B/l LE doppler negative. Will admit to CDU for pain control and IV Abx; ID to see in AM. 66 year old with PMH HTN, Prostate CA (Jan-march 2017 s/p radiation therapy), presenting with b/l calf erythema since Thursday morning that spread to medial thighs. Patient in ED yesterday, referred to dermatology with biopsy obtained. patient now with extreme difficulty walking due to pain, growing erythema/warmth on right calf. no fevers. pain was well treated with motrin. ER and Dermatologist thought it could be fungal, erythema nodosum, bug bites, possible cellulitis. Pt now reports pain at R hip with no skin changes. Also c/o chills. Denies fever, nausea/vomiting, mucous membrane involvement.    PCP Brock Danielle & (family friend) Terry Lombardo    Attending:  In ED, pt seen by ED to be d/c home on doxy and cefpodoxime, however pt c/o unbearable pain. B/l LE doppler negative. Will admit to CDU for pain control and IV Abx; ID to see in AM.

## 2017-08-26 NOTE — ED ADULT NURSE REASSESSMENT NOTE - COMFORT CARE
plan of care explained/warm blanket provided/ambulated to bathroom/darkened lights/repositioned
ambulated to bathroom

## 2017-08-26 NOTE — ED ADULT NURSE NOTE - OBJECTIVE STATEMENT
66 y.o male c c/o L calf redness since Thursday morning. Thursday patient had similar lesions on left calf and thigh. pt saw dermatologist and had biopsy. patient Pain upon ambulating now in L calf. redness on R calf/warm. Denies fever. pain is well treated with Motrin. Family at bedside.

## 2017-08-26 NOTE — ED CDU PROVIDER NOTE - PLAN OF CARE
Continue your current medication regimen.  Take doxycycline 100mg twice daily for 14 days. Take with food and avoid sun exposure while taking this medication.   Take cefadroxil 500mg twice daily for 7 days.  Follow up with your Primary Care Physician within the next 2-3 days for further evaluation and blood work for Lyme disease.   Bring a copy of your test results with you to your appointment.  Return to the Emergency Room if you experience new or worsening symptoms. Continue your current medication regimen.  Take doxycycline 100mg twice daily for 14 days. Take with food and avoid sun exposure while taking this medication.   Take cefadroxil 500mg twice daily for 7 days.  Follow up with your Primary Care Physician within the next 2-3 days for further evaluation and blood work for Lyme disease.   Bring a copy of your test results with you to your appointment.  Return to the Emergency Room if you experience new or worsening symptoms including worsening pain, redness, fever, altered mental status, joint pains, inability to tolerate antibiotics, or any other concerns.

## 2017-08-26 NOTE — ED PROVIDER NOTE - CARE PLAN
Principal Discharge DX:	Cellulitis of lower extremity, unspecified laterality Principal Discharge DX:	Cellulitis of lower extremity, unspecified laterality  Instructions for follow-up, activity and diet:	Follow up with your primary care doctor within 48-72 hours.   You must return for new, worsening or concerning symptoms; specifically including those listed on the attached sheet.   Discuss a tick born illness blood panel with your doctor.  Take doxycycline 100mg 2x per day for 14 days   Take Cefadroxil 500mg 2x per day for 7 days  You must return for new, worsening or concerning symptom; specifically including those listed on the attached sheet.

## 2017-08-26 NOTE — ED PROVIDER NOTE - PHYSICAL EXAMINATION
[place]: A & O x 3, NAD, HEENT WNL and no facial asymmetry; lungs CTAB, heart with reg rhythm; abdomen soft obese NTND; extremities with bilateral trace edema; skin with warmth, erythema and tenderness at right calf, tenderness with no obvious erythema on left calf. non specific erythema at medial thighs virtually imperceptible, neuro exam non focal with no motor or sensory deficits

## 2017-08-26 NOTE — ED PROVIDER NOTE - PLAN OF CARE
Follow up with your primary care doctor within 48-72 hours.   You must return for new, worsening or concerning symptoms; specifically including those listed on the attached sheet.   Discuss a tick born illness blood panel with your doctor.  Take doxycycline 100mg 2x per day for 14 days   Take Cefadroxil 500mg 2x per day for 7 days  You must return for new, worsening or concerning symptom; specifically including those listed on the attached sheet.

## 2017-08-27 DIAGNOSIS — L03.119 CELLULITIS OF UNSPECIFIED PART OF LIMB: ICD-10-CM

## 2017-08-27 DIAGNOSIS — B35.3 TINEA PEDIS: ICD-10-CM

## 2017-08-27 DIAGNOSIS — I10 ESSENTIAL (PRIMARY) HYPERTENSION: ICD-10-CM

## 2017-08-27 PROCEDURE — 99223 1ST HOSP IP/OBS HIGH 75: CPT | Mod: AI

## 2017-08-27 PROCEDURE — 71020: CPT | Mod: 26

## 2017-08-27 PROCEDURE — 99217: CPT

## 2017-08-27 RX ORDER — OXYCODONE HYDROCHLORIDE 5 MG/1
5 TABLET ORAL ONCE
Qty: 0 | Refills: 0 | Status: DISCONTINUED | OUTPATIENT
Start: 2017-08-27 | End: 2017-08-27

## 2017-08-27 RX ORDER — KETOROLAC TROMETHAMINE 30 MG/ML
15 SYRINGE (ML) INJECTION ONCE
Qty: 0 | Refills: 0 | Status: DISCONTINUED | OUTPATIENT
Start: 2017-08-27 | End: 2017-08-27

## 2017-08-27 RX ORDER — KETOROLAC TROMETHAMINE 30 MG/ML
30 SYRINGE (ML) INJECTION ONCE
Qty: 0 | Refills: 0 | Status: DISCONTINUED | OUTPATIENT
Start: 2017-08-27 | End: 2017-08-27

## 2017-08-27 RX ORDER — LACTOBACILLUS ACIDOPH-L.BULGARICUS 1 MILLION CELL CHEWABLE TABLET 1MM CELL
1 TABLET,CHEWABLE ORAL DAILY
Qty: 0 | Refills: 0 | Status: DISCONTINUED | OUTPATIENT
Start: 2017-08-27 | End: 2017-08-29

## 2017-08-27 RX ADMIN — Medication 15 MILLIGRAM(S): at 21:57

## 2017-08-27 RX ADMIN — Medication 15 MILLIGRAM(S): at 21:27

## 2017-08-27 RX ADMIN — OXYCODONE HYDROCHLORIDE 5 MILLIGRAM(S): 5 TABLET ORAL at 07:28

## 2017-08-27 RX ADMIN — OXYCODONE HYDROCHLORIDE 5 MILLIGRAM(S): 5 TABLET ORAL at 01:08

## 2017-08-27 RX ADMIN — OXYCODONE HYDROCHLORIDE 5 MILLIGRAM(S): 5 TABLET ORAL at 19:32

## 2017-08-27 RX ADMIN — AMLODIPINE BESYLATE 5 MILLIGRAM(S): 2.5 TABLET ORAL at 21:27

## 2017-08-27 RX ADMIN — Medication 15 MILLIGRAM(S): at 05:38

## 2017-08-27 RX ADMIN — Medication 15 MILLIGRAM(S): at 04:58

## 2017-08-27 RX ADMIN — OXYCODONE HYDROCHLORIDE 5 MILLIGRAM(S): 5 TABLET ORAL at 13:06

## 2017-08-27 RX ADMIN — Medication 30 MILLIGRAM(S): at 13:05

## 2017-08-27 RX ADMIN — Medication 1 APPLICATION(S): at 18:41

## 2017-08-27 RX ADMIN — OXYCODONE HYDROCHLORIDE 5 MILLIGRAM(S): 5 TABLET ORAL at 20:02

## 2017-08-27 RX ADMIN — OXYCODONE HYDROCHLORIDE 5 MILLIGRAM(S): 5 TABLET ORAL at 00:21

## 2017-08-27 RX ADMIN — Medication 100 MILLIGRAM(S): at 18:41

## 2017-08-27 RX ADMIN — Medication 100 MILLIGRAM(S): at 17:06

## 2017-08-27 RX ADMIN — Medication 110 MILLIGRAM(S): at 06:36

## 2017-08-27 RX ADMIN — SODIUM CHLORIDE 125 MILLILITER(S): 9 INJECTION INTRAMUSCULAR; INTRAVENOUS; SUBCUTANEOUS at 08:48

## 2017-08-27 RX ADMIN — Medication 30 MILLIGRAM(S): at 12:58

## 2017-08-27 RX ADMIN — OXYCODONE HYDROCHLORIDE 5 MILLIGRAM(S): 5 TABLET ORAL at 07:00

## 2017-08-27 RX ADMIN — OXYCODONE HYDROCHLORIDE 5 MILLIGRAM(S): 5 TABLET ORAL at 12:58

## 2017-08-27 RX ADMIN — Medication 100 MILLIGRAM(S): at 08:46

## 2017-08-27 RX ADMIN — Medication 100 MILLIGRAM(S): at 01:09

## 2017-08-27 NOTE — H&P ADULT - ASSESSMENT
67 yo M with hx. of prostate ca s/p rt, htn p/w bilateral lower extremity cellulitis and concern for early lyme disease

## 2017-08-27 NOTE — PROVIDER CONTACT NOTE (OTHER) - SITUATION
pt questioning his P.O. antibiotics vs. iv and wanted certain pain medication that worked well for him in ED

## 2017-08-27 NOTE — CONSULT NOTE ADULT - SUBJECTIVE AND OBJECTIVE BOX
HPI:   Patient is a 66y male who presents to [facility] with a chief complaint of bilateral lower extremity erythema and painful to touch which started on Thursday. He reports that the day prior he felt OK. He denies prior to thursday any fever, chills sweats or rigors. he denies any trauma or injury to lower extremity. He report that he had a recent trip to [place] but stayed at a hotel and did not do outdoors activities and commented that there were minimal trees in the area. He reports he does not hunt, does not camp or spend time in wooded areas. He said he came to ER here at [facility] and was told that he possibly had erythema nodosum and was recommended that he go to a dermatologist. He was able to get a dermatology consult and ended getting a biopsy performed on friday afternoon. He reports that by Friday night the redness migrated up his legs and noted that he had it on both lower legs. He spoke with Dr Lombardo and he suggested that the patient return to the hospital. He was seen by the Infectious Disease hospital service who recommend for patient to take cefadroxil for cellulitis and doxycycline for possible early Lyme.   I was called by Dr Lombardo for a second opinion.     REVIEW OF SYSTEMS:  All other review of systems negative (Comprehensive ROS)    PAST MEDICAL & SURGICAL HISTORY:  Prostate CA: finished treatment in 2017 (march)  HTN (hypertension)  S/P cholecystectomy  S/P colon resection: diverticulitis  History of hip replacement      Allergies    No Known Allergies    Intolerances        Antimicrobials Day #    ceFAZolin   IVPB 1000 milliGRAM(s) IV Intermittent every 8 hours    Other Medications:  amLODIPine   Tablet 5 milliGRAM(s) Oral daily  sodium chloride 0.9%. 1000 milliLiter(s) IV Continuous <Continuous>      FAMILY HISTORY:      SOCIAL HISTORY:  Smoking: none      ETOH: None   T(F): 98.4 (08-27-17 @ 12:09), Max: 99.1 (08-26-17 @ 15:31)  HR: 76 (08-27-17 @ 12:09)  BP: 154/92 (08-27-17 @ 12:09)  RR: 16 (08-27-17 @ 12:09)  SpO2: 97% (08-27-17 @ 12:09)  Wt(kg): --    PHYSICAL EXAM:  General: alert, no acute distress  Eyes:  anicteric, no conjunctival injection, no discharge  Oropharynx: no lesions or injection 	  Neck: supple, without adenopathy  Lungs: clear to auscultation  Heart: regular rate and rhythm; no murmur, rubs or gallops  Abdomen: soft, nondistended, nontender, without mass or organomegaly  Skin: tinea pedis noted on bilateral feet   Extremities: bilateral lower extremity erythema medial leg up thigh and lower right leg is tender to touch, warm and indurated   Neurologic: alert, oriented, moves all extremities    LAB RESULTS:                        15.7   4.2   )-----------( 174      ( 26 Aug 2017 17:11 )             46.3     08-26    141  |  104  |  13  ----------------------------<  109<H>  5.0   |  25  |  1.07    Ca    9.2      26 Aug 2017 17:11    TPro  7.7  /  Alb  4.0  /  TBili  0.3  /  DBili  x   /  AST  34  /  ALT  21  /  AlkPhos  92  08-26    LIVER FUNCTIONS - ( 26 Aug 2017 17:11 )  Alb: 4.0 g/dL / Pro: 7.7 g/dL / ALK PHOS: 92 U/L / ALT: 21 U/L RC / AST: 34 U/L / GGT: x               MICROBIOLOGY:  RECENT CULTURES:        RADIOLOGY REVIEWED:    < from: [facility] Duplex Lower Ext Vein Scan, Bilat (08.26.17 @ 20:55) >    EXAM:  DUPLEX SCAN EXT VEINS LOWER BI                            PROCEDURE DATE:  08/26/2017            INTERPRETATION:  CLINICAL INFORMATION: Bilateral lower leg swelling.   Right worse than left.    COMPARISON: None available.    TECHNIQUE: Duplex sonography of the BILATERAL LOWER extremities with   color and spectral Doppler, with and without compression.      FINDINGS:    There is normal compressibility of the bilateral common femoral, femoral   and popliteal veins. No calf vein thrombosis is detected.    Doppler examination shows normal spontaneous and phasic flow.    IMPRESSION:     No evidence of bilateral lower extremity deep venous thrombosis.                NANCY ALFONSO M.D., RADIOLOGY RESIDENT  This document has been electronically signed.  ANEL MCGINNIS M.D., ATTENDING RADIOLOGIST  This document has been electronically signed. Aug 26 2017 10:19PM                < end of copied text >

## 2017-08-27 NOTE — H&P ADULT - NEGATIVE OPHTHALMOLOGIC SYMPTOMS
no blurred vision R/no photophobia/no lacrimation L/no blurred vision L/no lacrimation R/no diplopia

## 2017-08-27 NOTE — H&P ADULT - NSHPLABSRESULTS_GEN_ALL_CORE
wbc 4.2  h/h 15.7/46.3  plt 174    crt 1.07    crp .90    va duplex b/l le    (-) dvt    cxr pending    ekg pending

## 2017-08-27 NOTE — CONSULT NOTE ADULT - ASSESSMENT
64 year old male who presents with acute onset of bilateral lower extremity erythema, tender to touch i suspect cellulitis of lower extremity perhaps portal of entry is tinea pedis cracks on skin of feet. Although agree with house ID group that a early Lyme EM is in the ddx. agree with doxy 100 mg po bid for 14 days. Also he can be checked for Lyme JOLIE and confirmatory western blot if positive  now and in four to six weeks  He is being admitted agree with Ancef  1 g IV q8 for cellulitis, reviewed his labs white count is normal , esr is normal and no fever recorded here in ER. Review case with Dr Lombardo on the phone. Also reviewed case with ID attending Dr Wyatt agree with her plan.

## 2017-08-27 NOTE — H&P ADULT - HISTORY OF PRESENT ILLNESS
66y male with PMHx of  prostate CA s/p RT, HTN, present bilateral lower extremity redness not associated with pruritis since Thursday. At first there was minor redness and tenderness around the ankle and up to mid shin and by Friday had started to increase into the upper part of the lower extremities. He spent a lot of time in the sun and denies any new foods, hives, itching, or any new medications.  He denies any fever, chills sweats or rigors. No trauma or injury to lower extremity. he went to the ED and was able to see a dermatologist who then did a skin biopsy and he went home. However, by Friday night he noticed the redness had migrated up his extremities and also noticed some spots on his upper thighs, He report that he had a recent trip to [place] but stayed at a hotel and did not do outdoors activities. He spoke with Dr Lombardo about the situation and he suggested that the patient return to the hospital.

## 2017-08-28 DIAGNOSIS — L30.9 DERMATITIS, UNSPECIFIED: ICD-10-CM

## 2017-08-28 LAB
ALBUMIN SERPL ELPH-MCNC: 3.2 G/DL — LOW (ref 3.3–5)
ALP SERPL-CCNC: 96 U/L — SIGNIFICANT CHANGE UP (ref 40–120)
ALT FLD-CCNC: 21 U/L — SIGNIFICANT CHANGE UP (ref 10–45)
ANION GAP SERPL CALC-SCNC: 12 MMOL/L — SIGNIFICANT CHANGE UP (ref 5–17)
AST SERPL-CCNC: 27 U/L — SIGNIFICANT CHANGE UP (ref 10–40)
BASOPHILS # BLD AUTO: 0.01 K/UL — SIGNIFICANT CHANGE UP (ref 0–0.2)
BASOPHILS NFR BLD AUTO: 0.3 % — SIGNIFICANT CHANGE UP (ref 0–2)
BILIRUB SERPL-MCNC: 0.4 MG/DL — SIGNIFICANT CHANGE UP (ref 0.2–1.2)
BUN SERPL-MCNC: 11 MG/DL — SIGNIFICANT CHANGE UP (ref 7–23)
CALCIUM SERPL-MCNC: 8 MG/DL — LOW (ref 8.4–10.5)
CHLORIDE SERPL-SCNC: 104 MMOL/L — SIGNIFICANT CHANGE UP (ref 96–108)
CO2 SERPL-SCNC: 23 MMOL/L — SIGNIFICANT CHANGE UP (ref 22–31)
CREAT SERPL-MCNC: 1.01 MG/DL — SIGNIFICANT CHANGE UP (ref 0.5–1.3)
EOSINOPHIL # BLD AUTO: 0.09 K/UL — SIGNIFICANT CHANGE UP (ref 0–0.5)
EOSINOPHIL NFR BLD AUTO: 2.3 % — SIGNIFICANT CHANGE UP (ref 0–6)
GLUCOSE SERPL-MCNC: 122 MG/DL — HIGH (ref 70–99)
HCT VFR BLD CALC: 40.7 % — SIGNIFICANT CHANGE UP (ref 39–50)
HGB BLD-MCNC: 13.4 G/DL — SIGNIFICANT CHANGE UP (ref 13–17)
IMM GRANULOCYTES NFR BLD AUTO: 0.5 % — SIGNIFICANT CHANGE UP (ref 0–1.5)
LYME C6 AB IGG/IGM EIA REFLEX WESTERN BL: SIGNIFICANT CHANGE UP
LYMPHOCYTES # BLD AUTO: 0.58 K/UL — LOW (ref 1–3.3)
LYMPHOCYTES # BLD AUTO: 15 % — SIGNIFICANT CHANGE UP (ref 13–44)
MAGNESIUM SERPL-MCNC: 1.8 MG/DL — SIGNIFICANT CHANGE UP (ref 1.6–2.6)
MCHC RBC-ENTMCNC: 31.1 PG — SIGNIFICANT CHANGE UP (ref 27–34)
MCHC RBC-ENTMCNC: 32.9 GM/DL — SIGNIFICANT CHANGE UP (ref 32–36)
MCV RBC AUTO: 94.4 FL — SIGNIFICANT CHANGE UP (ref 80–100)
MONOCYTES # BLD AUTO: 0.26 K/UL — SIGNIFICANT CHANGE UP (ref 0–0.9)
MONOCYTES NFR BLD AUTO: 6.7 % — SIGNIFICANT CHANGE UP (ref 2–14)
NEUTROPHILS # BLD AUTO: 2.9 K/UL — SIGNIFICANT CHANGE UP (ref 1.8–7.4)
NEUTROPHILS NFR BLD AUTO: 75.2 % — SIGNIFICANT CHANGE UP (ref 43–77)
PLATELET # BLD AUTO: 165 K/UL — SIGNIFICANT CHANGE UP (ref 150–400)
POTASSIUM SERPL-MCNC: 3.7 MMOL/L — SIGNIFICANT CHANGE UP (ref 3.5–5.3)
POTASSIUM SERPL-SCNC: 3.7 MMOL/L — SIGNIFICANT CHANGE UP (ref 3.5–5.3)
PROT SERPL-MCNC: 6.4 G/DL — SIGNIFICANT CHANGE UP (ref 6–8.3)
RBC # BLD: 4.31 M/UL — SIGNIFICANT CHANGE UP (ref 4.2–5.8)
RBC # FLD: 12.3 % — SIGNIFICANT CHANGE UP (ref 10.3–14.5)
SODIUM SERPL-SCNC: 139 MMOL/L — SIGNIFICANT CHANGE UP (ref 135–145)
WBC # BLD: 3.86 K/UL — SIGNIFICANT CHANGE UP (ref 3.8–10.5)
WBC # FLD AUTO: 3.86 K/UL — SIGNIFICANT CHANGE UP (ref 3.8–10.5)

## 2017-08-28 PROCEDURE — 99223 1ST HOSP IP/OBS HIGH 75: CPT | Mod: GC

## 2017-08-28 RX ORDER — KETOROLAC TROMETHAMINE 30 MG/ML
15 SYRINGE (ML) INJECTION EVERY 6 HOURS
Qty: 0 | Refills: 0 | Status: DISCONTINUED | OUTPATIENT
Start: 2017-08-28 | End: 2017-08-29

## 2017-08-28 RX ORDER — OXYCODONE HYDROCHLORIDE 5 MG/1
5 TABLET ORAL ONCE
Qty: 0 | Refills: 0 | Status: DISCONTINUED | OUTPATIENT
Start: 2017-08-28 | End: 2017-08-28

## 2017-08-28 RX ORDER — KETOROLAC TROMETHAMINE 30 MG/ML
30 SYRINGE (ML) INJECTION ONCE
Qty: 0 | Refills: 0 | Status: DISCONTINUED | OUTPATIENT
Start: 2017-08-28 | End: 2017-08-28

## 2017-08-28 RX ORDER — IBUPROFEN 200 MG
400 TABLET ORAL ONCE
Qty: 0 | Refills: 0 | Status: DISCONTINUED | OUTPATIENT
Start: 2017-08-28 | End: 2017-08-29

## 2017-08-28 RX ORDER — OXYCODONE HYDROCHLORIDE 5 MG/1
5 TABLET ORAL EVERY 4 HOURS
Qty: 0 | Refills: 0 | Status: DISCONTINUED | OUTPATIENT
Start: 2017-08-28 | End: 2017-08-29

## 2017-08-28 RX ADMIN — OXYCODONE HYDROCHLORIDE 5 MILLIGRAM(S): 5 TABLET ORAL at 19:29

## 2017-08-28 RX ADMIN — Medication 30 MILLIGRAM(S): at 13:16

## 2017-08-28 RX ADMIN — Medication 15 MILLIGRAM(S): at 19:55

## 2017-08-28 RX ADMIN — Medication 30 MILLIGRAM(S): at 13:46

## 2017-08-28 RX ADMIN — OXYCODONE HYDROCHLORIDE 5 MILLIGRAM(S): 5 TABLET ORAL at 13:46

## 2017-08-28 RX ADMIN — LACTOBACILLUS ACIDOPH-L.BULGARICUS 1 MILLION CELL CHEWABLE TABLET 1 TABLET(S): at 11:40

## 2017-08-28 RX ADMIN — OXYCODONE HYDROCHLORIDE 5 MILLIGRAM(S): 5 TABLET ORAL at 09:30

## 2017-08-28 RX ADMIN — OXYCODONE HYDROCHLORIDE 5 MILLIGRAM(S): 5 TABLET ORAL at 02:26

## 2017-08-28 RX ADMIN — Medication 100 MILLIGRAM(S): at 17:16

## 2017-08-28 RX ADMIN — Medication 1 APPLICATION(S): at 06:17

## 2017-08-28 RX ADMIN — Medication 1 APPLICATION(S): at 17:16

## 2017-08-28 RX ADMIN — OXYCODONE HYDROCHLORIDE 5 MILLIGRAM(S): 5 TABLET ORAL at 01:56

## 2017-08-28 RX ADMIN — Medication 100 MILLIGRAM(S): at 09:04

## 2017-08-28 RX ADMIN — Medication 100 MILLIGRAM(S): at 01:15

## 2017-08-28 RX ADMIN — Medication 15 MILLIGRAM(S): at 19:25

## 2017-08-28 RX ADMIN — OXYCODONE HYDROCHLORIDE 5 MILLIGRAM(S): 5 TABLET ORAL at 09:04

## 2017-08-28 RX ADMIN — OXYCODONE HYDROCHLORIDE 5 MILLIGRAM(S): 5 TABLET ORAL at 19:55

## 2017-08-28 RX ADMIN — OXYCODONE HYDROCHLORIDE 5 MILLIGRAM(S): 5 TABLET ORAL at 13:16

## 2017-08-28 RX ADMIN — Medication 100 MILLIGRAM(S): at 06:17

## 2017-08-28 NOTE — CONSULT NOTE ADULT - SUBJECTIVE AND OBJECTIVE BOX
HPI:  67 y/o M w/ hx prostate cancer s/p XRT presents w/ bilateral lower extremity erythema. Began as 2 lesions on his calf 5 days PTA which soonafter began to be spread around his leg and also involved his thighs. Reports significant pain and "heat" in his legs but denies pruritus. He had multiple visits to the ED and hospital  and was diagnosed w/ erythema nodosum and treated w/ ibuprofen initially. Was started on cefazolin and doxycyline this admission for concern of possible bilateral cellulitis vs. lyme disease. ID following pt and less concerned for infectious etiology but recommend continuing empiric abx. Of note, pt saw an outside dermatologist last Friday who performed a biopsy which is still pending. Pt also reports significant LE edema, he believes it began around the same time as erythema but may have began just after.    PAST MEDICAL & SURGICAL HISTORY:  Prostate CA: finished treatment in 2017 (march)  HTN (hypertension)  S/P cholecystectomy  S/P colon resection: diverticulitis  History of hip replacement      REVIEW OF SYSTEMS      General: no fevers/chills, no lethary	    Skin/Breast: see HPI  	  Ophthalmologic: no eye pain or change in vision  	  ENMT: no dysphagia or change in hearing    Respiratory and Thorax: no SOB or cough  	  Cardiovascular: no palpitations or chest pain    Gastrointestinal: no abdomenal pain or blood in stool     Genitourinary: no dysuria or frequency    Musculoskeletal: no joint pains or weakness	    Neurological:no weakness, numbness , or tingling    MEDICATIONS  (STANDING):  ceFAZolin   IVPB 1000 milliGRAM(s) IV Intermittent every 8 hours  doxycycline hyclate Capsule 100 milliGRAM(s) Oral every 12 hours  lactobacillus acidophilus and bulgaricus Chewable 1 Tablet(s) Chew daily  clotrimazole 1% Cream 1 Application(s) Topical two times a day  ibuprofen  Tablet 400 milliGRAM(s) Oral once  ketorolac   Injectable 15 milliGRAM(s) IV Push every 6 hours    MEDICATIONS  (PRN):  oxyCODONE    IR 5 milliGRAM(s) Oral every 4 hours PRN Severe Pain (7 - 10)      Allergies    No Known Allergies    Intolerances        SOCIAL HISTORY: lives w/ wife    FAMILY HISTORY:  No pertinent family history in first degree relatives      Vital Signs Last 24 Hrs  T(C): 36.8 (28 Aug 2017 13:16), Max: 37.2 (27 Aug 2017 21:24)  T(F): 98.2 (28 Aug 2017 13:16), Max: 99 (27 Aug 2017 21:24)  HR: 77 (28 Aug 2017 13:16) (76 - 92)  BP: 140/79 (28 Aug 2017 13:16) (117/70 - 140/79)  BP(mean): --  RR: 18 (28 Aug 2017 13:16) (17 - 18)  SpO2: 97% (28 Aug 2017 13:16) (96% - 98%)    PHYSICAL EXAM:     The patient was alert and oriented X 3, well nourished, and in no  apparent distress.  OP showed no ulcerations  There was no visible lymphadenopathy.  Conjunctiva were non injected  There was no clubbing or edema of extremities.  The scalp, hair, face, eyebrows, lips, OP, neck, chest, back,   extremities X 4, nails were examined.  There was no hyperhidrosis or bromhidrosis.    Of note on skin exam:       LABS:                        13.4   3.86  )-----------( 165      ( 28 Aug 2017 08:40 )             40.7     08-28    139  |  104  |  11  ----------------------------<  122<H>  3.7   |  23  |  1.01    Ca    8.0<L>      28 Aug 2017 08:55  Mg     1.8     08-28    TPro  6.4  /  Alb  3.2<L>  /  TBili  0.4  /  DBili  x   /  AST  27  /  ALT  21  /  AlkPhos  96  08-28          RADIOLOGY & ADDITIONAL STUDIES: VA duplex LE (08/26): No evidence of bilateral lower extremity deep venous thrombosis. HPI:  65 y/o M w/ hx prostate cancer s/p XRT presents w/ bilateral lower extremity erythema. Began as 2 lesions on his calf 5 days PTA which soonafter began to be spread around his leg and also involved his thighs. Reports significant pain and "heat" in his legs but denies pruritus. He had multiple visits to the ED and hospital  and was diagnosed w/ erythema nodosum and treated w/ ibuprofen initially. Was started on cefazolin and doxycyline this admission for concern of possible bilateral cellulitis vs. lyme disease. ID following pt and less concerned for infectious etiology but recommend continuing empiric abx. Of note, pt saw an outside dermatologist last Friday who performed a biopsy which is still pending. Pt also reports significant LE edema, he believes it began around the same time as erythema but may have began just after.    PAST MEDICAL & SURGICAL HISTORY:  Prostate CA: finished treatment in 2017 (march)  HTN (hypertension)  S/P cholecystectomy  S/P colon resection: diverticulitis  History of hip replacement      REVIEW OF SYSTEMS      General: no fevers/chills, no lethary	    Skin/Breast: see HPI  	  Ophthalmologic: no eye pain or change in vision  	  ENMT: no dysphagia or change in hearing    Respiratory and Thorax: no SOB or cough  	  Cardiovascular: no palpitations or chest pain    Gastrointestinal: no abdomenal pain or blood in stool     Genitourinary: no dysuria or frequency    Musculoskeletal: no joint pains or weakness	    Neurological:no weakness, numbness , or tingling    MEDICATIONS  (STANDING):  ceFAZolin   IVPB 1000 milliGRAM(s) IV Intermittent every 8 hours  doxycycline hyclate Capsule 100 milliGRAM(s) Oral every 12 hours  lactobacillus acidophilus and bulgaricus Chewable 1 Tablet(s) Chew daily  clotrimazole 1% Cream 1 Application(s) Topical two times a day  ibuprofen  Tablet 400 milliGRAM(s) Oral once  ketorolac   Injectable 15 milliGRAM(s) IV Push every 6 hours    MEDICATIONS  (PRN):  oxyCODONE    IR 5 milliGRAM(s) Oral every 4 hours PRN Severe Pain (7 - 10)      Allergies    No Known Allergies    Intolerances        SOCIAL HISTORY: lives w/ wife    FAMILY HISTORY:  No pertinent family history in first degree relatives      Vital Signs Last 24 Hrs  T(C): 36.8 (28 Aug 2017 13:16), Max: 37.2 (27 Aug 2017 21:24)  T(F): 98.2 (28 Aug 2017 13:16), Max: 99 (27 Aug 2017 21:24)  HR: 77 (28 Aug 2017 13:16) (76 - 92)  BP: 140/79 (28 Aug 2017 13:16) (117/70 - 140/79)  BP(mean): --  RR: 18 (28 Aug 2017 13:16) (17 - 18)  SpO2: 97% (28 Aug 2017 13:16) (96% - 98%)    PHYSICAL EXAM:     The patient was alert and oriented X 3, well nourished, and in no  apparent distress.  OP showed no ulcerations  There was no visible lymphadenopathy.  Conjunctiva were non injected  There was no clubbing or edema of extremities.  The scalp, hair, face, eyebrows, lips, OP, neck, chest, back,   extremities X 4, nails were examined.  There was no hyperhidrosis or bromhidrosis.    Of note on skin exam:   - Erythematous patches on bilat medial legs and medial thighs    LABS:                        13.4   3.86  )-----------( 165      ( 28 Aug 2017 08:40 )             40.7     08-28    139  |  104  |  11  ----------------------------<  122<H>  3.7   |  23  |  1.01    Ca    8.0<L>      28 Aug 2017 08:55  Mg     1.8     08-28    TPro  6.4  /  Alb  3.2<L>  /  TBili  0.4  /  DBili  x   /  AST  27  /  ALT  21  /  AlkPhos  96  08-28          RADIOLOGY & ADDITIONAL STUDIES: VA duplex LE (08/26): No evidence of bilateral lower extremity deep venous thrombosis.

## 2017-08-28 NOTE — PROGRESS NOTE ADULT - SUBJECTIVE AND OBJECTIVE BOX
Patient is a 66y old  Male who presents with a chief complaint of infection in my legs (27 Aug 2017 15:10)      SUBJECTIVE / OVERNIGHT EVENTS:    pt with family seated bedside no event overnight  pt denies cp abd allred n/v/d no fever currently.     Vital Signs Last 24 Hrs  T(C): 36.8 (28 Aug 2017 13:16), Max: 37.2 (27 Aug 2017 21:24)  T(F): 98.2 (28 Aug 2017 13:16), Max: 99 (27 Aug 2017 21:24)  HR: 77 (28 Aug 2017 13:16) (76 - 92)  BP: 140/79 (28 Aug 2017 13:16) (117/70 - 149/88)  BP(mean): --  RR: 18 (28 Aug 2017 13:16) (17 - 18)  SpO2: 97% (28 Aug 2017 13:16) (96% - 98%)  I&O's Summary    27 Aug 2017 07:01  -  28 Aug 2017 07:00  --------------------------------------------------------  IN: 0 mL / OUT: 200 mL / NET: -200 mL    28 Aug 2017 07:01  -  28 Aug 2017 14:05  --------------------------------------------------------  IN: 380 mL / OUT: 550 mL / NET: -170 mL            LABS:                        13.4   3.86  )-----------( 165      ( 28 Aug 2017 08:40 )             40.7     08-28    139  |  104  |  11  ----------------------------<  122<H>  3.7   |  23  |  1.01    Ca    8.0<L>      28 Aug 2017 08:55  Mg     1.8     08-28    TPro  6.4  /  Alb  3.2<L>  /  TBili  0.4  /  DBili  x   /  AST  27  /  ALT  21  /  AlkPhos  96  08-28      CAPILLARY BLOOD GLUCOSE                RADIOLOGY & ADDITIONAL TESTS:    Imaging Personally Reviewed:  [x] YES  [ ] NO    Consultant(s) Notes Reviewed:  [x] YES  [ ] NO      MEDICATIONS  (STANDING):  ceFAZolin   IVPB 1000 milliGRAM(s) IV Intermittent every 8 hours  doxycycline hyclate Capsule 100 milliGRAM(s) Oral every 12 hours  lactobacillus acidophilus and bulgaricus Chewable 1 Tablet(s) Chew daily  clotrimazole 1% Cream 1 Application(s) Topical two times a day  ibuprofen  Tablet 400 milliGRAM(s) Oral once    MEDICATIONS  (PRN):  oxyCODONE    IR 5 milliGRAM(s) Oral every 4 hours PRN Severe Pain (7 - 10)      Care Discussed with Consultants/Other Providers [x] YES  [ ] NO    HEALTH ISSUES - PROBLEM Dx:  Tinea pedis: Tinea pedis  HTN (hypertension): HTN (hypertension)  Cellulitis of lower extremity, unspecified laterality: Cellulitis of lower extremity, unspecified laterality

## 2017-08-28 NOTE — PROGRESS NOTE ADULT - SUBJECTIVE AND OBJECTIVE BOX
CC: f/u for possible cellulitis of legs    Patient reports no major change in appearance of legs,they still are painful.He has b/l calf erythema, rt greater than left,as well as b/l areas of faint thigh erythema,again right grater than left.He does not have any upper extremity lesions.No fever although he is chilled.No trauma to regions, no known insect bites.    REVIEW OF SYSTEMS:  All other review of systems negative (Comprehensive ROS)    Antimicrobials Day #2  :  ceFAZolin   IVPB 1000 milliGRAM(s) IV Intermittent every 8 hours  doxycycline hyclate Capsule 100 milliGRAM(s) Oral every 12 hours    Other Medications Reviewed    T(F): 98.1 (08-28-17 @ 08:47), Max: 99 (08-27-17 @ 21:24)  HR: 84 (08-28-17 @ 08:47)  BP: 139/84 (08-28-17 @ 08:47)  RR: 18 (08-28-17 @ 08:47)  SpO2: 98% (08-28-17 @ 08:47)  Wt(kg): --    PHYSICAL EXAM:  General: alert, no acute distress  Eyes:  anicteric, no conjunctival injection, no discharge  Oropharynx: no lesions or injection 	  Neck: supple, without adenopathy  Lungs: clear to auscultation  Heart: regular rate and rhythm; no murmur, rubs or gallops  Abdomen: soft, nondistended, nontender, without mass or organomegaly  Skin: erythema of both calves and mild patchy areas of erythema of inner thighs.  Extremities: no clubbing, cyanosis, or edema  Neurologic: alert, oriented, moves all extremities    LAB RESULTS:                        13.4   3.86  )-----------( 165      ( 28 Aug 2017 08:40 )             40.7     08-28    139  |  104  |  11  ----------------------------<  122<H>  3.7   |  23  |  1.01    Ca    8.0<L>      28 Aug 2017 08:55  Mg     1.8     08-28    TPro  6.4  /  Alb  3.2<L>  /  TBili  0.4  /  DBili  x   /  AST  27  /  ALT  21  /  AlkPhos  96  08-28    LIVER FUNCTIONS - ( 28 Aug 2017 08:55 )  Alb: 3.2 g/dL / Pro: 6.4 g/dL / ALK PHOS: 96 U/L / ALT: 21 U/L / AST: 27 U/L / GGT: x             MICROBIOLOGY:  RECENT CULTURES:  none    RADIOLOGY REVIEWED:  Lower extremity dopplers.Negative

## 2017-08-28 NOTE — CONSULT NOTE ADULT - ASSESSMENT
67 y/o M w/ hx prostate cancer s/p XRT presents w/ bilateral lower extremity erythema. Infection is less likely considering the bilateral and symmetric distribution of the lesions. Etiology is not clear, but we favor diagnosis of acute venous stasis dermatitis (in setting of recent edema) vs. capillaritis vs. less likely early leukocytoclastic vasculitis. 67 y/o M w/ hx prostate cancer s/p XRT presents w/ bilateral lower extremity erythema. Infection is less likely considering the bilateral and symmetric distribution of the lesions. Etiology is not clear, but we favor diagnosis of  capillaritis vs. less likely early leukocytoclastic vasculitis v. mild stasis dermatitis

## 2017-08-28 NOTE — CONSULT NOTE ADULT - PROBLEM SELECTOR RECOMMENDATION 9
- Favor dx of venous stasis dermatitis vs. capillaritis  - Recommend leg elevation  - Recommend compression bandage of left leg. Discussed w/ pt that diagnosis is not clear and that venous stasis dermatitis should improve w/ compression. We discussed that we can try 1 leg initially to see if there is improvement  - Recommend triamcinolone 0.1% cream to bilateral lower extremities   - Will follow up outpatient dermatology biopsy result - Recommend leg elevation  - Recommend empiric trial of compression bandage of left leg  - Recommend triamcinolone 0.1% cream to bilateral lower extremities   - Will follow up outpatient dermatology biopsy result

## 2017-08-29 ENCOUNTER — TRANSCRIPTION ENCOUNTER (OUTPATIENT)
Age: 67
End: 2017-08-29

## 2017-08-29 VITALS
OXYGEN SATURATION: 97 % | RESPIRATION RATE: 18 BRPM | HEART RATE: 69 BPM | TEMPERATURE: 98 F | SYSTOLIC BLOOD PRESSURE: 125 MMHG | DIASTOLIC BLOOD PRESSURE: 76 MMHG

## 2017-08-29 LAB
ANION GAP SERPL CALC-SCNC: 12 MMOL/L — SIGNIFICANT CHANGE UP (ref 5–17)
BASOPHILS # BLD AUTO: 0.04 K/UL — SIGNIFICANT CHANGE UP (ref 0–0.2)
BASOPHILS NFR BLD AUTO: 1 % — SIGNIFICANT CHANGE UP (ref 0–2)
BUN SERPL-MCNC: 12 MG/DL — SIGNIFICANT CHANGE UP (ref 7–23)
CALCIUM SERPL-MCNC: 8.4 MG/DL — SIGNIFICANT CHANGE UP (ref 8.4–10.5)
CHLORIDE SERPL-SCNC: 104 MMOL/L — SIGNIFICANT CHANGE UP (ref 96–108)
CO2 SERPL-SCNC: 25 MMOL/L — SIGNIFICANT CHANGE UP (ref 22–31)
CREAT SERPL-MCNC: 0.91 MG/DL — SIGNIFICANT CHANGE UP (ref 0.5–1.3)
CRP SERPL-MCNC: 2.3 MG/DL — HIGH (ref 0–0.4)
EOSINOPHIL # BLD AUTO: 0.17 K/UL — SIGNIFICANT CHANGE UP (ref 0–0.5)
EOSINOPHIL NFR BLD AUTO: 4.1 % — SIGNIFICANT CHANGE UP (ref 0–6)
ERYTHROCYTE [SEDIMENTATION RATE] IN BLOOD: 24 MM/HR — HIGH (ref 0–20)
GLUCOSE SERPL-MCNC: 99 MG/DL — SIGNIFICANT CHANGE UP (ref 70–99)
HBA1C BLD-MCNC: 5.5 % — SIGNIFICANT CHANGE UP (ref 4–5.6)
HCT VFR BLD CALC: 40.4 % — SIGNIFICANT CHANGE UP (ref 39–50)
HGB BLD-MCNC: 13.2 G/DL — SIGNIFICANT CHANGE UP (ref 13–17)
IMM GRANULOCYTES NFR BLD AUTO: 0 % — SIGNIFICANT CHANGE UP (ref 0–1.5)
LYMPHOCYTES # BLD AUTO: 0.8 K/UL — LOW (ref 1–3.3)
LYMPHOCYTES # BLD AUTO: 19.1 % — SIGNIFICANT CHANGE UP (ref 13–44)
MCHC RBC-ENTMCNC: 31.1 PG — SIGNIFICANT CHANGE UP (ref 27–34)
MCHC RBC-ENTMCNC: 32.7 GM/DL — SIGNIFICANT CHANGE UP (ref 32–36)
MCV RBC AUTO: 95.3 FL — SIGNIFICANT CHANGE UP (ref 80–100)
MONOCYTES # BLD AUTO: 0.37 K/UL — SIGNIFICANT CHANGE UP (ref 0–0.9)
MONOCYTES NFR BLD AUTO: 8.9 % — SIGNIFICANT CHANGE UP (ref 2–14)
NEUTROPHILS # BLD AUTO: 2.8 K/UL — SIGNIFICANT CHANGE UP (ref 1.8–7.4)
NEUTROPHILS NFR BLD AUTO: 66.9 % — SIGNIFICANT CHANGE UP (ref 43–77)
PLATELET # BLD AUTO: 174 K/UL — SIGNIFICANT CHANGE UP (ref 150–400)
POTASSIUM SERPL-MCNC: 3.9 MMOL/L — SIGNIFICANT CHANGE UP (ref 3.5–5.3)
POTASSIUM SERPL-SCNC: 3.9 MMOL/L — SIGNIFICANT CHANGE UP (ref 3.5–5.3)
RBC # BLD: 4.24 M/UL — SIGNIFICANT CHANGE UP (ref 4.2–5.8)
RBC # FLD: 12.4 % — SIGNIFICANT CHANGE UP (ref 10.3–14.5)
SODIUM SERPL-SCNC: 141 MMOL/L — SIGNIFICANT CHANGE UP (ref 135–145)
WBC # BLD: 4.18 K/UL — SIGNIFICANT CHANGE UP (ref 3.8–10.5)
WBC # FLD AUTO: 4.18 K/UL — SIGNIFICANT CHANGE UP (ref 3.8–10.5)

## 2017-08-29 PROCEDURE — 96375 TX/PRO/DX INJ NEW DRUG ADDON: CPT

## 2017-08-29 PROCEDURE — 85014 HEMATOCRIT: CPT

## 2017-08-29 PROCEDURE — 99254 IP/OBS CNSLTJ NEW/EST MOD 60: CPT | Mod: GC

## 2017-08-29 PROCEDURE — 86225 DNA ANTIBODY NATIVE: CPT

## 2017-08-29 PROCEDURE — 80048 BASIC METABOLIC PNL TOTAL CA: CPT

## 2017-08-29 PROCEDURE — 86038 ANTINUCLEAR ANTIBODIES: CPT

## 2017-08-29 PROCEDURE — 96374 THER/PROPH/DIAG INJ IV PUSH: CPT

## 2017-08-29 PROCEDURE — 86140 C-REACTIVE PROTEIN: CPT

## 2017-08-29 PROCEDURE — 83605 ASSAY OF LACTIC ACID: CPT

## 2017-08-29 PROCEDURE — 84165 PROTEIN E-PHORESIS SERUM: CPT

## 2017-08-29 PROCEDURE — 83036 HEMOGLOBIN GLYCOSYLATED A1C: CPT

## 2017-08-29 PROCEDURE — 84295 ASSAY OF SERUM SODIUM: CPT

## 2017-08-29 PROCEDURE — 86160 COMPLEMENT ANTIGEN: CPT

## 2017-08-29 PROCEDURE — 82435 ASSAY OF BLOOD CHLORIDE: CPT

## 2017-08-29 PROCEDURE — 85652 RBC SED RATE AUTOMATED: CPT

## 2017-08-29 PROCEDURE — 82803 BLOOD GASES ANY COMBINATION: CPT

## 2017-08-29 PROCEDURE — 96376 TX/PRO/DX INJ SAME DRUG ADON: CPT

## 2017-08-29 PROCEDURE — 84132 ASSAY OF SERUM POTASSIUM: CPT

## 2017-08-29 PROCEDURE — 93970 EXTREMITY STUDY: CPT

## 2017-08-29 PROCEDURE — 85027 COMPLETE CBC AUTOMATED: CPT

## 2017-08-29 PROCEDURE — 84155 ASSAY OF PROTEIN SERUM: CPT

## 2017-08-29 PROCEDURE — 86036 ANCA SCREEN EACH ANTIBODY: CPT

## 2017-08-29 PROCEDURE — 83735 ASSAY OF MAGNESIUM: CPT

## 2017-08-29 PROCEDURE — 80053 COMPREHEN METABOLIC PANEL: CPT

## 2017-08-29 PROCEDURE — 99285 EMERGENCY DEPT VISIT HI MDM: CPT | Mod: 25

## 2017-08-29 PROCEDURE — 86235 NUCLEAR ANTIGEN ANTIBODY: CPT

## 2017-08-29 PROCEDURE — 86618 LYME DISEASE ANTIBODY: CPT

## 2017-08-29 PROCEDURE — 83516 IMMUNOASSAY NONANTIBODY: CPT

## 2017-08-29 PROCEDURE — 82947 ASSAY GLUCOSE BLOOD QUANT: CPT

## 2017-08-29 PROCEDURE — G0378: CPT

## 2017-08-29 PROCEDURE — 71046 X-RAY EXAM CHEST 2 VIEWS: CPT

## 2017-08-29 PROCEDURE — 82330 ASSAY OF CALCIUM: CPT

## 2017-08-29 RX ORDER — OXYCODONE HYDROCHLORIDE 5 MG/1
1 TABLET ORAL
Qty: 30 | Refills: 0 | OUTPATIENT
Start: 2017-08-29 | End: 2017-09-03

## 2017-08-29 RX ORDER — IBUPROFEN 200 MG
1 TABLET ORAL
Qty: 56 | Refills: 0 | OUTPATIENT
Start: 2017-08-29 | End: 2017-09-12

## 2017-08-29 RX ORDER — AMLODIPINE BESYLATE 2.5 MG/1
5 TABLET ORAL
Qty: 150 | Refills: 0 | OUTPATIENT
Start: 2017-08-29 | End: 2017-09-28

## 2017-08-29 RX ORDER — LACTOBACILLUS ACIDOPH-L.BULGARICUS 1 MILLION CELL CHEWABLE TABLET 1MM CELL
1 TABLET,CHEWABLE ORAL
Qty: 30 | Refills: 0 | OUTPATIENT
Start: 2017-08-29 | End: 2017-09-28

## 2017-08-29 RX ADMIN — OXYCODONE HYDROCHLORIDE 5 MILLIGRAM(S): 5 TABLET ORAL at 07:56

## 2017-08-29 RX ADMIN — Medication 100 MILLIGRAM(S): at 01:04

## 2017-08-29 RX ADMIN — OXYCODONE HYDROCHLORIDE 5 MILLIGRAM(S): 5 TABLET ORAL at 01:11

## 2017-08-29 RX ADMIN — Medication 15 MILLIGRAM(S): at 01:10

## 2017-08-29 RX ADMIN — Medication 1 APPLICATION(S): at 05:43

## 2017-08-29 RX ADMIN — Medication 15 MILLIGRAM(S): at 13:08

## 2017-08-29 RX ADMIN — Medication 100 MILLIGRAM(S): at 09:22

## 2017-08-29 RX ADMIN — OXYCODONE HYDROCHLORIDE 5 MILLIGRAM(S): 5 TABLET ORAL at 13:12

## 2017-08-29 RX ADMIN — OXYCODONE HYDROCHLORIDE 5 MILLIGRAM(S): 5 TABLET ORAL at 01:40

## 2017-08-29 RX ADMIN — Medication 15 MILLIGRAM(S): at 07:57

## 2017-08-29 RX ADMIN — Medication 100 MILLIGRAM(S): at 05:43

## 2017-08-29 RX ADMIN — Medication 15 MILLIGRAM(S): at 01:25

## 2017-08-29 NOTE — CONSULT NOTE ADULT - SUBJECTIVE AND OBJECTIVE BOX
KALPANA CRAWFORD  [id number]    HISTORY OF PRESENT ILLNESS:  67 y/o M w/ hx prostate cancer s/p XRT admitted for 5 days of w/ bilateral lower extremity erythema and edema. States the rash, which was tender to touch, was initially LE distally and began to move up his legs. He had multiple visits to the hospital and was treated for erythema nodosum and treated w/ ibuprofen initially, then treated with cefazolin and doxycyline for possible cellulitis vs. lyme disease. ID following pt and less concerned for infectious etiology but recommend continuing empiric abx.    Pt denied any such rash in the past.  Denied any fever, wt loss, or any other constitutional symptoms.      PAST MEDICAL & SURGICAL HISTORY:  Prostate CA: finished treatment in 2017 ([facility])  HTN (hypertension)  S/P cholecystectomy  S/P colon resection: diverticulitis  History of hip replacement      Review of Systems:  Gen:  No fevers/chills, weight loss  HEENT: No blurry vision, no difficulty swallowing  CVS: No chest pain/palpitations  Resp: No SOB/wheezing  GI: No N/V/C/D/abdominal pain  MSK:  Skin: No new rashes  Neuro: No headaches    MEDICATIONS  (STANDING):  lactobacillus acidophilus and bulgaricus Chewable 1 Tablet(s) Chew daily  clotrimazole 1% Cream 1 Application(s) Topical two times a day  ibuprofen  Tablet 400 milliGRAM(s) Oral once  ketorolac   Injectable 15 milliGRAM(s) IV Push every 6 hours  triamcinolone 0.1% Cream 1 Application(s) Topical two times a day    MEDICATIONS  (PRN):  oxyCODONE    IR 5 milliGRAM(s) Oral every 4 hours PRN Severe Pain (7 - 10)      Allergies    No Known Allergies    Intolerances        PERTINENT MEDICATION HISTORY:    SOCIAL HISTORY:  OCCUPATION:  TRAVEL HISTORY:    FAMILY HISTORY:  No pertinent family history in first degree relatives      Vital Signs Last 24 Hrs  T(C): 36.6 (29 Aug 2017 09:21), Max: 36.8 (28 Aug 2017 13:16)  T(F): 97.9 (29 Aug 2017 09:21), Max: 98.2 (28 Aug 2017 13:16)  HR: 72 (29 Aug 2017 09:21) (62 - 78)  BP: 119/71 (29 Aug 2017 09:21) (119/71 - 140/79)  BP(mean): --  RR: 18 (29 Aug 2017 09:21) (18 - 18)  SpO2: 95% (29 Aug 2017 09:21) (95% - 97%)    Daily     Daily     Physical Exam:  General: No apparent distress  HEENT: EOMI, MMM  CVS: +S1/S2, RRR, no murmurs/rubs/gallops  Resp: CTA b/l. No crackles/wheezing  GI: Soft, NT/ND +BS  MSK:  Shoulders: wnl  Elbows: wnl  Wrists: wnl  MCPs: wnl  PIPs: wnl  DIPs: wnl   Hips: wnl  Knees: wnl   Ankle: wnl  Neuro: AAOx3  Skin: no visible rashes    LABS:                        13.2   4.18  )-----------( 174      ( 29 Aug 2017 09:05 )             40.4     08-29    141  |  104  |  12  ----------------------------<  99  3.9   |  25  |  0.91    Ca    8.4      29 Aug 2017 09:03  Mg     1.8     08-28    TPro  6.4  /  Alb  3.2<L>  /  TBili  0.4  /  DBili  x   /  AST  27  /  ALT  21  /  AlkPhos  96  08-28          RADIOLOGY & ADDITIONAL STUDIES: KALPANA CRAWFORD  [id number]    HISTORY OF PRESENT ILLNESS:  65 y/o M w/ hx prostate cancer s/p XRT admitted for 5 days of w/ bilateral lower extremity erythema and edema. States the rash, which was tender to touch, was initially LE distally and began to move up his legs. He had multiple visits to the hospital and was treated for erythema nodosum and treated w/ ibuprofen initially, then treated with cefazolin and doxycyline for possible cellulitis vs. lyme disease. ID following pt and less concerned for infectious etiology but recommend continuing empiric abx.    Pt denied any such rash in the past.  Denied any fever, wt loss, or any other constitutional symptoms.  Denies any chest pain, SOB, abd pain, dysuria, hematuria, joint pain, am stiffness, hemoptysis, nasal or oral ulcers, or any other complaints on ROS.  Pt with recent travel to [place] 3 days PTA but denied any insect bites.  No hx of allergies      PAST MEDICAL & SURGICAL HISTORY:  Prostate CA: finished treatment in 2017 ([facility])  HTN (hypertension)  S/P cholecystectomy  S/P colon resection: diverticulitis  History of hip replacement      Review of Systems:  Gen:  No fevers/chills, weight loss  HEENT: No blurry vision, no difficulty swallowing  CVS: No chest pain/palpitations  Resp: No SOB/wheezing  GI: No N/V/C/D/abdominal pain  MSK: denied ny joint pain  Skin: new, tender rashes in LE b/l  Neuro: No headaches    MEDICATIONS  (STANDING):  lactobacillus acidophilus and bulgaricus Chewable 1 Tablet(s) Chew daily  clotrimazole 1% Cream 1 Application(s) Topical two times a day  ibuprofen  Tablet 400 milliGRAM(s) Oral once  ketorolac   Injectable 15 milliGRAM(s) IV Push every 6 hours  triamcinolone 0.1% Cream 1 Application(s) Topical two times a day    MEDICATIONS  (PRN):  oxyCODONE    IR 5 milliGRAM(s) Oral every 4 hours PRN Severe Pain (7 - 10)      Allergies    No Known Allergies    Intolerances        PERTINENT MEDICATION HISTORY:    SOCIAL HISTORY: denied smoking or alcohol use  OCCUPATION:NA  TRAVEL HISTORY: recent travel to [place]    FAMILY HISTORY:  No pertinent family history in first degree relatives      Vital Signs Last 24 Hrs  T(C): 36.6 (29 Aug 2017 09:21), Max: 36.8 (28 Aug 2017 13:16)  T(F): 97.9 (29 Aug 2017 09:21), Max: 98.2 (28 Aug 2017 13:16)  HR: 72 (29 Aug 2017 09:21) (62 - 78)  BP: 119/71 (29 Aug 2017 09:21) (119/71 - 140/79)  BP(mean): --  RR: 18 (29 Aug 2017 09:21) (18 - 18)  SpO2: 95% (29 Aug 2017 09:21) (95% - 97%)    Daily     Daily     Physical Exam:  General: No apparent distress  HEENT: EOMI, MMM  CVS: +S1/S2, RRR, no murmurs/rubs/gallops  Resp: CTA b/l. No crackles/wheezing  GI: Soft, NT/ND +BS  MSK:no synovitis appreciated; active and passive ROM WNL; MS 5/5 in UE and LE b/l  Neuro: AAOx3  Skin:+ b/l erythematous rashes, TTP    LABS:                        13.2   4.18  )-----------( 174      ( 29 Aug 2017 09:05 )             40.4     08-29    141  |  104  |  12  ----------------------------<  99  3.9   |  25  |  0.91    Ca    8.4      29 Aug 2017 09:03  Mg     1.8     08-28    TPro  6.4  /  Alb  3.2<L>  /  TBili  0.4  /  DBili  x   /  AST  27  /  ALT  21  /  AlkPhos  96  08-28    ESR 4  CRP 2.3      RADIOLOGY & ADDITIONAL STUDIES: KALPANA CRAWFORD  [id number]    HISTORY OF PRESENT ILLNESS:  67 y/o M w/ hx prostate cancer s/p XRT admitted for 5 days of w/ bilateral lower extremity erythema and edema. States the rash, which was tender to touch, was initially LE distally and began to move up his legs. He had multiple visits to the hospital and was treated for erythema nodosum and treated w/ ibuprofen initially, then treated with cefazolin and doxycyline for possible cellulitis vs. lyme disease. ID following pt and less concerned for infectious etiology but recommend continuing empiric abx.    Pt denied any such rash in the past.  Denied any fever, wt loss, or any other constitutional symptoms.  Denies any chest pain, SOB, abd pain, dysuria, hematuria, joint pain, am stiffness, hemoptysis, nasal or oral ulcers, or any other complaints on ROS.  Pt with recent travel to [place] 3 days PTA but denied any insect bites.  No hx of allergies      PAST MEDICAL & SURGICAL HISTORY:  Prostate CA: finished treatment in 2017 ([facility])  HTN (hypertension)  S/P cholecystectomy  S/P colon resection: diverticulitis  History of hip replacement      Review of Systems:  Gen:  No fevers/chills, weight loss  HEENT: No blurry vision, no difficulty swallowing  CVS: No chest pain/palpitations  Resp: No SOB/wheezing  GI: No N/V/C/D/abdominal pain  MSK: denied ny joint pain  Skin: new, tender rashes in LE b/l  Neuro: No headaches    MEDICATIONS  (STANDING):  lactobacillus acidophilus and bulgaricus Chewable 1 Tablet(s) Chew daily  clotrimazole 1% Cream 1 Application(s) Topical two times a day  ibuprofen  Tablet 400 milliGRAM(s) Oral once  ketorolac   Injectable 15 milliGRAM(s) IV Push every 6 hours  triamcinolone 0.1% Cream 1 Application(s) Topical two times a day    MEDICATIONS  (PRN):  oxyCODONE    IR 5 milliGRAM(s) Oral every 4 hours PRN Severe Pain (7 - 10)      Allergies    No Known Allergies    Intolerances        PERTINENT MEDICATION HISTORY:    SOCIAL HISTORY: denied smoking or alcohol use  OCCUPATION:NA  TRAVEL HISTORY: recent travel to [place]    FAMILY HISTORY:  No pertinent family history in first degree relatives      Vital Signs Last 24 Hrs  T(C): 36.6 (29 Aug 2017 09:21), Max: 36.8 (28 Aug 2017 13:16)  T(F): 97.9 (29 Aug 2017 09:21), Max: 98.2 (28 Aug 2017 13:16)  HR: 72 (29 Aug 2017 09:21) (62 - 78)  BP: 119/71 (29 Aug 2017 09:21) (119/71 - 140/79)  BP(mean): --  RR: 18 (29 Aug 2017 09:21) (18 - 18)  SpO2: 95% (29 Aug 2017 09:21) (95% - 97%)    Daily     Daily     Physical Exam:  General: No apparent distress  HEENT: EOMI, MMM  CVS: +S1/S2, RRR, no murmurs/rubs/gallops  Resp: CTA b/l. No crackles/wheezing  GI: Soft, NT/ND +BS  MSK:no synovitis appreciated; active and passive ROM WNL; MS 5/5 in UE and LE b/l  Neuro: AAOx3  Skin:+ b/l erythematous rashes, TTP over the rash    LABS:                        13.2   4.18  )-----------( 174      ( 29 Aug 2017 09:05 )             40.4     08-29    141  |  104  |  12  ----------------------------<  99  3.9   |  25  |  0.91    Ca    8.4      29 Aug 2017 09:03  Mg     1.8     08-28    TPro  6.4  /  Alb  3.2<L>  /  TBili  0.4  /  DBili  x   /  AST  27  /  ALT  21  /  AlkPhos  96  08-28    ESR 4  CRP 2.3      RADIOLOGY & ADDITIONAL STUDIES:

## 2017-08-29 NOTE — PROGRESS NOTE ADULT - ASSESSMENT
65 yo male with almost symmetric process of legs which usually makes infection less likely.  His pain is out of proportion to other signs of infection.  His presentation is not typical for either cellulitis or lyme.  ? If this could be some type of contact dermatitis.  I would continue present regimen.His skin biopsy is not ready.  Consider dermatology f/u.I have reviewed with Dr Lombardo, the option for ortho evaluation if hip pain persists also exists.
Probable venous stasis dermatitis  The symmetric nature of process speaks against infection.  I agree with dermatology,this does not appear to be infection.  lyme titer is negative, no reason on clinical grounds for empiric treatment  The picture is not c/w bacterial cellulitis  Will d/c antibiotics  advise leg elevation,? NSAI's and I would consider ace leg wraps to keep edema at a minimum  ID issues reviewed  with the wife and Dr Aponte
66y male with PMHx of  prostate CA s/p RT, HTN, present bilateral lower extremity redness not associated b/l LE erythema unclear etiology treating cellulites    B/L cellulitis - unclear etiology infectious vs inflammatory  f/u ESR CRP ASHA pt s/p recent bx derm / rheum consult to be placed.  discussed with Dr Durbin discussed with medicine NP      HTN HLD bp minimally elevated today monitor for today will titrate if remains elevated.      DVT PPX  discussed with outpt pcp Dr Burgess .

## 2017-08-29 NOTE — DISCHARGE NOTE ADULT - MEDICATION SUMMARY - MEDICATIONS TO TAKE
I will START or STAY ON the medications listed below when I get home from the hospital:    ibuprofen 400 mg oral tablet  -- 1 tab(s) by mouth 4 times a day, As Needed mild pain  -- Indication: For Pain    oxyCODONE 5 mg oral tablet  -- 1 tab(s) by mouth every 4 hours, As needed, Severe Pain (7 - 10) MDD:6 tab  -- Indication: For Pain    Norvasc 5 mg oral tablet  -- 5 tab(s) by mouth once a day  -- It is very important that you take or use this exactly as directed.  Do not skip doses or discontinue unless directed by your doctor.  Some non-prescription drugs may aggravate your condition.  Read all labels carefully.  If a warning appears, check with your doctor before taking.    -- Indication: For HTN (hypertension)    triamcinolone 0.1% topical cream  -- 1 application on skin 2 times a day  -- Indication: For Dermatitis    clotrimazole 1% topical cream  -- 1 application on skin 2 times a day feet  -- Indication: For Tinea pedis    lactobacillus acidophilus and bulgaricus oral tablet, chewable  -- 1 tab(s) by mouth once a day  -- Indication: For gi protective

## 2017-08-29 NOTE — CONSULT NOTE ADULT - ATTENDING COMMENTS
66M with erythema of RLE now spreading to LLE x 2d.  s/p derm biopsy yesterday, no results available yet    1. ddx: strep cellulitis vs early disseminated Lyme  2. doxy 100mg po bid x 14d for Lyme  3. cefadroxil 500mg po bid x 7d for cellulitis  4. Lyme serology may be falsely negative early on, but with multiple lesions it may be positive by now.  he prefers to wait and have the Lyme screen done next week at his PMD's office since he already had blood drawn yesterday and today  5. advised to take doxy with food and avoid sun exposure/use sunscreen
patient seen and examined by me personally  case reviewed   agree with assessment and plan as above   will follow up with us as outpateint depending   on results of skin biopsy

## 2017-08-29 NOTE — DISCHARGE NOTE ADULT - CARE PROVIDER_API CALL
Brock Danielle), Internal Medicine; Nephrology  [address] [place] [address]  Phone: [phone]  Fax: [phone]

## 2017-08-29 NOTE — PROGRESS NOTE ADULT - SUBJECTIVE AND OBJECTIVE BOX
CC: f/u for b/l lower extremity rash    Patient reports mild improvement in leg pain    REVIEW OF SYSTEMS:  All other review of systems negative (Comprehensive ROS)    Antimicrobials Day #  4:    Other Medications Reviewed    T(F): 97.9 (08-29-17 @ 09:21), Max: 98.2 (08-28-17 @ 13:16)  HR: 72 (08-29-17 @ 09:21)  BP: 119/71 (08-29-17 @ 09:21)  RR: 18 (08-29-17 @ 09:21)  SpO2: 95% (08-29-17 @ 09:21)  Wt(kg): --    PHYSICAL EXAM:  General: alert, no acute distress  Eyes:  anicteric, no conjunctival injection, no discharge  Oropharynx: no lesions or injection 	  Neck: supple, without adenopathy  Lungs: clear to auscultation  Heart: regular rate and rhythm; no murmur, rubs or gallops  Abdomen: soft, nondistended, nontender, without mass or organomegaly  Skin: no lesions  Extremities: no clubbing, cyanosis, trace edema.B/L areas of faint erythema, mostly in calves  Neurologic: alert, oriented, moves all extremities    LAB RESULTS:                        13.2   4.18  )-----------( 174      ( 29 Aug 2017 09:05 )             40.4     08-29    141  |  104  |  12  ----------------------------<  99  3.9   |  25  |  0.91    Ca    8.4      29 Aug 2017 09:03  Mg     1.8     08-28    TPro  6.4  /  Alb  3.2<L>  /  TBili  0.4  /  DBili  x   /  AST  27  /  ALT  21  /  AlkPhos  96  08-28    LIVER FUNCTIONS - ( 28 Aug 2017 08:55 )  Alb: 3.2 g/dL / Pro: 6.4 g/dL / ALK PHOS: 96 U/L / ALT: 21 U/L / AST: 27 U/L / GGT: x           Lyme titer negative  MICROBIOLOGY:  RECENT CULTURES:      RADIOLOGY REVIEWED:

## 2017-08-29 NOTE — DISCHARGE NOTE ADULT - PATIENT PORTAL LINK FT
“You can access the [facility] Patient Portal, offered by [facility], by registering with the following website: [email or web address]”

## 2017-08-29 NOTE — DISCHARGE NOTE ADULT - PLAN OF CARE
no rash Follow up with your private dermatologist in 1 week  return to emergency if worsens /80 Continue Norvasc Follow up with your private dermatologist in 1 week  elevate legs, ace wrap, follow up also with your primary care physician   return to emergency if worsens Follow up with your private dermatologist in 1 week  elevate legs, ace wrap as needed, follow up also with your primary care physician   return to emergency if worsens

## 2017-08-29 NOTE — DISCHARGE NOTE ADULT - HOSPITAL COURSE
66y male with PMHx of  prostate CA s/p RT, HTN, present bilateral lower extremity redness not associated b/l LE erythema intimally treated as  cellulites but less likely infectious at this time.  pt b/l le erythema likely venous statis.  pt has been seen by ID [facility]ER[facility] Rheum and cleared for DC off abx.  pt will f/u with Dr slaazar later this week for continued care and monitoring.  pt has improved greatly

## 2017-08-29 NOTE — CONSULT NOTE ADULT - ASSESSMENT
65 yo M with 6 day hx of b/l LE erythematous rash without any other systemic complaints or findings to support rheumatological etiology. 65 yo M with 6 day hx of b/l LE erythematous rash without any other systemic complaints or findings to support rheumatological etiology.    Will follow up with the skin bx results.  Given that pt improved on antibiotics, would recommend continuing the course.  Pt to call the rheumatology office, if he develops further symptoms.   ARUN Bautista. 67 yo M with 6 day hx of b/l LE erythematous rash without any other systemic complaints or findings to support rheumatological etiology.    Will follow up with the skin bx results.  Will order SLE, ANCA serologies as well as UA  Given that pt improved on antibiotics, would recommend continuing the course.  Pt to call the rheumatology office, if he develops further symptoms.   ARUN Bautista. 65 yo M with 6 day hx of b/l LE erythematous rash without any other systemic complaints or findings to support rheumatological etiology.    Will follow up with the skin bx results.  would obtain order UA, ASHA, DNA, JOSY, complement, ANCA with MPO and PR3, sjogrens ab, hep panel and quantiferon  Given that pt improved on antibiotics, would recommend continuing the course.  Pt to call the rheumatology office, if he develops further symptoms.   ARUN Bautista.    ARUN Farah - said pt will get the above tests as outpatient with PMD and should they be abn, she will advise the  patient to follow up with our office as outpatient. 67 yo M with 6 day hx of b/l LE erythematous rash without any other systemic complaints or findings to support rheumatological etiology.    Will follow up with the skin bx results.  would obtain order UA, ASHA, DNA, JOSY, complement, ANCA with MPO and PR3, sjogrens ab, SPEP, UPEP, immunoglobulin panel, hep panel and quantiferon  Given that pt improved on antibiotics, would recommend continuing the course.  Pt to call the rheumatology office, if he develops further symptoms.   ARUN Bautista.    ARUN Farah - said pt will get the above tests as outpatient with PMD and should they be abn, she will advise the  patient to follow up with our office as outpatient.

## 2017-08-29 NOTE — DISCHARGE NOTE ADULT - MEDICATION SUMMARY - MEDICATIONS TO STOP TAKING
I will STOP taking the medications listed below when I get home from the hospital:    doxycycline hyclate 100 mg oral tablet  -- 1 tab(s) by mouth 2 times a day  -- Avoid prolonged or excessive exposure to direct and/or artificial sunlight while taking this medication.  Do not take this drug if you are pregnant.  Finish all this medication unless otherwise directed by prescriber.  Medication should be taken with plenty of water.    cefadroxil 500 mg oral capsule  -- 1 cap(s) by mouth every 12 hours  -- Finish all this medication unless otherwise directed by prescriber.

## 2017-08-29 NOTE — DISCHARGE NOTE ADULT - CARE PLAN
Principal Discharge DX:	Dermatitis  Goal:	no rash  Secondary Diagnosis:	Essential hypertension Principal Discharge DX:	Dermatitis  Goal:	no rash  Instructions for follow-up, activity and diet:	Follow up with your private dermatologist in 1 week  return to emergency if worsens  Secondary Diagnosis:	Essential hypertension  Goal:	/80  Instructions for follow-up, activity and diet:	Continue Norvasc Principal Discharge DX:	Dermatitis  Goal:	no rash  Instructions for follow-up, activity and diet:	Follow up with your private dermatologist in 1 week  elevate legs, ace wrap, follow up also with your primary care physician   return to emergency if worsens  Secondary Diagnosis:	Essential hypertension  Goal:	/80  Instructions for follow-up, activity and diet:	Continue Norvasc Principal Discharge DX:	Dermatitis  Goal:	no rash  Instructions for follow-up, activity and diet:	Follow up with your private dermatologist in 1 week  elevate legs, ace wrap as needed, follow up also with your primary care physician   return to emergency if worsens  Secondary Diagnosis:	Essential hypertension  Goal:	/80  Instructions for follow-up, activity and diet:	Continue Norvasc

## 2017-08-30 LAB
AUTO DIFF PNL BLD: NEGATIVE — SIGNIFICANT CHANGE UP
C-ANCA SER-ACNC: NEGATIVE — SIGNIFICANT CHANGE UP
MYELOPEROXIDASE AB SER-ACNC: <5 UNITS — SIGNIFICANT CHANGE UP
MYELOPEROXIDASE CELLS FLD QL: NEGATIVE — SIGNIFICANT CHANGE UP
P-ANCA SER-ACNC: NEGATIVE — SIGNIFICANT CHANGE UP

## 2017-08-31 LAB
ANA TITR SER: NEGATIVE — SIGNIFICANT CHANGE UP
C3 SERPL-MCNC: 155 MG/DL — SIGNIFICANT CHANGE UP (ref 80–180)
C4 SERPL-MCNC: 32 MG/DL — SIGNIFICANT CHANGE UP (ref 10–45)
DSDNA AB SER-ACNC: <12 IU/ML — SIGNIFICANT CHANGE UP
PROT SERPL-MCNC: 6.5 G/DL — SIGNIFICANT CHANGE UP (ref 6–8.3)
PROT SERPL-MCNC: 6.5 G/DL — SIGNIFICANT CHANGE UP (ref 6–8.3)

## 2017-09-01 LAB
% ALBUMIN: 52.8 % — SIGNIFICANT CHANGE UP
% ALPHA 1: 5.8 % — SIGNIFICANT CHANGE UP
% ALPHA 2: 12 % — SIGNIFICANT CHANGE UP
% BETA: 14.7 % — SIGNIFICANT CHANGE UP
% GAMMA: 14.7 % — SIGNIFICANT CHANGE UP
ALBUMIN SERPL ELPH-MCNC: 3.4 G/DL — LOW (ref 3.6–5.5)
ALBUMIN/GLOB SERPL ELPH: 1.1 RATIO — SIGNIFICANT CHANGE UP
ALPHA1 GLOB SERPL ELPH-MCNC: 0.4 G/DL — SIGNIFICANT CHANGE UP (ref 0.1–0.4)
ALPHA2 GLOB SERPL ELPH-MCNC: 0.8 G/DL — SIGNIFICANT CHANGE UP (ref 0.5–1)
ANTI-RIBONUCLEAR PROTEIN: <0.2 AI — SIGNIFICANT CHANGE UP
B-GLOBULIN SERPL ELPH-MCNC: 1 G/DL — SIGNIFICANT CHANGE UP (ref 0.5–1)
DSDNA AB FLD-ACNC: <0.2 AI — SIGNIFICANT CHANGE UP
ENA SM AB FLD QL: <0.2 AI — SIGNIFICANT CHANGE UP
ENA SS-A AB FLD IA-ACNC: <0.2 AI — SIGNIFICANT CHANGE UP
GAMMA GLOBULIN: 1 G/DL — SIGNIFICANT CHANGE UP (ref 0.6–1.6)
PROT PATTERN SERPL ELPH-IMP: SIGNIFICANT CHANGE UP

## 2017-09-02 LAB
PROTEINASE3 AB FLD-ACNC: <5 UNITS — SIGNIFICANT CHANGE UP
PROTEINASE3 AB SER-ACNC: NEGATIVE — SIGNIFICANT CHANGE UP

## 2017-09-06 ENCOUNTER — TRANSCRIPTION ENCOUNTER (OUTPATIENT)
Age: 67
End: 2017-09-06

## 2017-09-19 RX ORDER — TAMSULOSIN HYDROCHLORIDE 0.4 MG/1
0.4 CAPSULE ORAL
Qty: 90 | Refills: 3 | Status: ACTIVE | COMMUNITY
Start: 2017-09-19 | End: 1900-01-01

## 2018-02-07 ENCOUNTER — APPOINTMENT (OUTPATIENT)
Dept: UROLOGY | Facility: CLINIC | Age: 68
End: 2018-02-07
Payer: COMMERCIAL

## 2018-02-07 DIAGNOSIS — R35.0 FREQUENCY OF MICTURITION: ICD-10-CM

## 2018-02-07 DIAGNOSIS — Z00.00 ENCOUNTER FOR GENERAL ADULT MEDICAL EXAMINATION W/OUT ABNORMAL FINDINGS: ICD-10-CM

## 2018-02-07 PROBLEM — C61 MALIGNANT NEOPLASM OF PROSTATE: Chronic | Status: ACTIVE | Noted: 2017-08-26

## 2018-02-07 LAB
APPEARANCE: CLEAR
BACTERIA: NEGATIVE
BILIRUBIN URINE: NEGATIVE
BLOOD URINE: NEGATIVE
COLOR: YELLOW
GLUCOSE QUALITATIVE U: NEGATIVE MG/DL
KETONES URINE: NEGATIVE
LEUKOCYTE ESTERASE URINE: NEGATIVE
MICROSCOPIC-UA: NORMAL
NITRITE URINE: NEGATIVE
PH URINE: 7
PROTEIN URINE: NEGATIVE MG/DL
PSA FREE FLD-MCNC: 14.1
PSA FREE SERPL-MCNC: 0.28 NG/ML
PSA SERPL-MCNC: 1.98 NG/ML
RED BLOOD CELLS URINE: 2 /HPF
SPECIFIC GRAVITY URINE: 1.02
SQUAMOUS EPITHELIAL CELLS: 0 /HPF
UROBILINOGEN URINE: NEGATIVE MG/DL
WHITE BLOOD CELLS URINE: 0 /HPF

## 2018-02-07 PROCEDURE — 99214 OFFICE O/P EST MOD 30 MIN: CPT

## 2018-02-08 LAB — CORE LAB FLUID CYTOLOGY: NORMAL

## 2018-08-08 ENCOUNTER — APPOINTMENT (OUTPATIENT)
Dept: UROLOGY | Facility: CLINIC | Age: 68
End: 2018-08-08
Payer: COMMERCIAL

## 2018-08-08 DIAGNOSIS — C61 MALIGNANT NEOPLASM OF PROSTATE: ICD-10-CM

## 2018-08-08 DIAGNOSIS — N40.1 BENIGN PROSTATIC HYPERPLASIA WITH LOWER URINARY TRACT SYMPMS: ICD-10-CM

## 2018-08-08 DIAGNOSIS — N13.8 BENIGN PROSTATIC HYPERPLASIA WITH LOWER URINARY TRACT SYMPMS: ICD-10-CM

## 2018-08-08 PROCEDURE — 99214 OFFICE O/P EST MOD 30 MIN: CPT

## 2018-08-08 RX ORDER — TAMSULOSIN HYDROCHLORIDE 0.4 MG/1
0.4 CAPSULE ORAL
Qty: 90 | Refills: 3 | Status: ACTIVE | COMMUNITY
Start: 2018-08-08 | End: 1900-01-01

## 2018-08-09 LAB
APPEARANCE: CLEAR
BACTERIA: NEGATIVE
BILIRUBIN URINE: NEGATIVE
BLOOD URINE: NEGATIVE
COLOR: YELLOW
CORE LAB FLUID CYTOLOGY: NORMAL
GLUCOSE QUALITATIVE U: NEGATIVE MG/DL
KETONES URINE: NEGATIVE
LEUKOCYTE ESTERASE URINE: NEGATIVE
MICROSCOPIC-UA: NORMAL
NITRITE URINE: NEGATIVE
PH URINE: 7
PROTEIN URINE: NEGATIVE MG/DL
PSA FREE FLD-MCNC: 18.1
PSA FREE SERPL-MCNC: 0.77 NG/ML
PSA SERPL-MCNC: 4.25 NG/ML
RED BLOOD CELLS URINE: 2 /HPF
SPECIFIC GRAVITY URINE: 1.02
SQUAMOUS EPITHELIAL CELLS: 0 /HPF
UROBILINOGEN URINE: NEGATIVE MG/DL
WHITE BLOOD CELLS URINE: 0 /HPF

## 2018-09-12 LAB
PSA FREE FLD-MCNC: 17.2
PSA FREE SERPL-MCNC: 1.24 NG/ML
PSA SERPL-MCNC: 7.19 NG/ML

## 2019-01-22 NOTE — H&P ADULT - REASON FOR ADMISSION
I checked with Dr. Santos's nurse and she can't get patient in any sooner than appt he has 1/29/19.   infection in my legs

## 2019-02-11 NOTE — PATIENT PROFILE ADULT. - MENTAL HEALTH CONDITIONS/SYMPTOMS, PROFILE
Implemented All Fall Risk Interventions:  [place] to call system. Call bell, personal items and telephone within reach. Instruct patient to call for assistance. Room bathroom lighting operational. Non-slip footwear when patient is off stretcher. Physically safe environment: no spills, clutter or unnecessary equipment. Stretcher in lowest position, wheels locked, appropriate side rails in place. Provide visual cue, wrist band, yellow gown, etc. Monitor gait and stability. Monitor for mental status changes and reorient to person, place, and time. Review medications for side effects contributing to fall risk. Reinforce activity limits and safety measures with patient and family.
none

## 2023-06-19 NOTE — PATIENT PROFILE ADULT. - HEALTH/HEALTHCARE ANXIETIES, PROFILE
Problem: Respiratory - Adult  Goal: Clear lung sounds  6/19/2023 0845 by Nida Polo RCP  Outcome: Progressing    Continue tx's to improve breath sounds, increase aeration and decrease WOB. none